# Patient Record
Sex: FEMALE | Race: WHITE | NOT HISPANIC OR LATINO | Employment: FULL TIME | URBAN - NONMETROPOLITAN AREA
[De-identification: names, ages, dates, MRNs, and addresses within clinical notes are randomized per-mention and may not be internally consistent; named-entity substitution may affect disease eponyms.]

---

## 2018-10-23 ENCOUNTER — APPOINTMENT (OUTPATIENT)
Dept: GENERAL RADIOLOGY | Facility: HOSPITAL | Age: 28
End: 2018-10-23

## 2018-10-23 ENCOUNTER — HOSPITAL ENCOUNTER (EMERGENCY)
Facility: HOSPITAL | Age: 28
Discharge: HOME | End: 2018-10-23
Attending: EMERGENCY MEDICINE | Admitting: EMERGENCY MEDICINE

## 2018-10-23 ENCOUNTER — APPOINTMENT (OUTPATIENT)
Dept: CT IMAGING | Facility: HOSPITAL | Age: 28
End: 2018-10-23

## 2018-10-23 VITALS
HEIGHT: 66 IN | DIASTOLIC BLOOD PRESSURE: 89 MMHG | TEMPERATURE: 98.9 F | BODY MASS INDEX: 27.97 KG/M2 | SYSTOLIC BLOOD PRESSURE: 140 MMHG | RESPIRATION RATE: 18 BRPM | HEART RATE: 97 BPM | OXYGEN SATURATION: 100 % | WEIGHT: 174 LBS

## 2018-10-23 DIAGNOSIS — S46.911A STRAIN OF RIGHT SHOULDER, INITIAL ENCOUNTER: ICD-10-CM
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
## 2018-10-23 DIAGNOSIS — S16.1XXA CERVICAL STRAIN, ACUTE, INITIAL ENCOUNTER: ICD-10-CM

## 2018-10-23 PROCEDURE — 73030 X-RAY EXAM OF SHOULDER: CPT | Performed by: RADIOLOGY

## 2018-10-23 PROCEDURE — 72125 CT NECK SPINE W/O DYE: CPT

## 2018-10-23 PROCEDURE — 72125 CT NECK SPINE W/O DYE: CPT | Performed by: RADIOLOGY

## 2018-10-23 PROCEDURE — 73030 X-RAY EXAM OF SHOULDER: CPT

## 2018-10-23 PROCEDURE — 72128 CT CHEST SPINE W/O DYE: CPT | Performed by: RADIOLOGY

## 2018-10-23 PROCEDURE — 99283 EMERGENCY DEPT VISIT LOW MDM: CPT

## 2018-10-23 PROCEDURE — 72128 CT CHEST SPINE W/O DYE: CPT

## 2018-10-23 RX ORDER — ORPHENADRINE CITRATE 100 MG/1
100 TABLET, EXTENDED RELEASE ORAL 2 TIMES DAILY
Qty: 20 TABLET | Refills: 0 | Status: ON HOLD | OUTPATIENT
Start: 2018-10-23 | End: 2020-02-24

## 2018-10-23 RX ORDER — IBUPROFEN 800 MG/1
800 TABLET ORAL
Qty: 60 TABLET | Refills: 0 | Status: ON HOLD | OUTPATIENT
Start: 2018-10-23 | End: 2020-02-24
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
## 2019-08-27 LAB
EXTERNAL ABO GROUPING: NORMAL
EXTERNAL ANTIBODY SCREEN: NEGATIVE
EXTERNAL HEPATITIS B SURFACE ANTIGEN: NEGATIVE
EXTERNAL RH FACTOR: POSITIVE
EXTERNAL RUBELLA QUALITATIVE: NORMAL
EXTERNAL SYPHILIS RPR SCREEN: NORMAL
HIV1 P24 AG SERPL QL IA: NORMAL

## 2020-02-20 LAB
EXTERNAL CHLAMYDIA SCREEN: NEGATIVE
EXTERNAL GONORRHEA SCREEN: NEGATIVE
EXTERNAL GROUP B STREP ANTIGEN: NEGATIVE

## 2020-02-24 ENCOUNTER — HOSPITAL ENCOUNTER (INPATIENT)
Facility: HOSPITAL | Age: 30
LOS: 3 days | Discharge: HOME | End: 2020-02-27
Attending: OBSTETRICS & GYNECOLOGY | Admitting: OBSTETRICS & GYNECOLOGY

## 2020-02-24 PROBLEM — O28.8 NON-REACTIVE NST (NON-STRESS TEST): Status: ACTIVE | Noted: 2020-02-24

## 2020-02-24 PROBLEM — O14.10 SEVERE PREECLAMPSIA: Status: ACTIVE | Noted: 2020-02-24

## 2020-02-24 LAB
ABO GROUP BLD: NORMAL
ABO GROUP BLD: NORMAL
ALBUMIN SERPL-MCNC: 3.05 G/DL (ref 3.5–5.2)
ALBUMIN/GLOB SERPL: 1 G/DL
ALP SERPL-CCNC: 155 U/L (ref 39–117)
ALT SERPL W P-5'-P-CCNC: 29 U/L (ref 1–33)
ANION GAP SERPL CALCULATED.3IONS-SCNC: 11.5 MMOL/L (ref 5–15)
AST SERPL-CCNC: 30 U/L (ref 1–32)
BASOPHILS # BLD AUTO: 0.02 10*3/MM3 (ref 0–0.2)
BASOPHILS NFR BLD AUTO: 0.3 % (ref 0–1.5)
BILIRUB SERPL-MCNC: 0.2 MG/DL (ref 0.2–1.2)
BLD GP AB SCN SERPL QL: NEGATIVE
BUN BLD-MCNC: 10 MG/DL (ref 6–20)
BUN/CREAT SERPL: 17.5 (ref 7–25)
CALCIUM SPEC-SCNC: 8.7 MG/DL (ref 8.6–10.5)
CHLORIDE SERPL-SCNC: 108 MMOL/L (ref 98–107)
CO2 SERPL-SCNC: 17.5 MMOL/L (ref 22–29)
CREAT BLD-MCNC: 0.57 MG/DL (ref 0.57–1)
DEPRECATED RDW RBC AUTO: 45.9 FL (ref 37–54)
EOSINOPHIL # BLD AUTO: 0.12 10*3/MM3 (ref 0–0.4)
EOSINOPHIL NFR BLD AUTO: 1.7 % (ref 0.3–6.2)
ERYTHROCYTE [DISTWIDTH] IN BLOOD BY AUTOMATED COUNT: 14.3 % (ref 12.3–15.4)
GFR SERPL CREATININE-BSD FRML MDRD: 127 ML/MIN/1.73
GLOBULIN UR ELPH-MCNC: 3.1 GM/DL
GLUCOSE BLD-MCNC: 113 MG/DL (ref 65–99)
HCT VFR BLD AUTO: 35.3 % (ref 34–46.6)
HGB BLD-MCNC: 11.6 G/DL (ref 12–15.9)
IMM GRANULOCYTES # BLD AUTO: 0.16 10*3/MM3 (ref 0–0.05)
IMM GRANULOCYTES NFR BLD AUTO: 2.3 % (ref 0–0.5)
LDH SERPL-CCNC: 156 U/L (ref 135–214)
LYMPHOCYTES # BLD AUTO: 1.23 10*3/MM3 (ref 0.7–3.1)
LYMPHOCYTES NFR BLD AUTO: 17.8 % (ref 19.6–45.3)
MCH RBC QN AUTO: 29.2 PG (ref 26.6–33)
MCHC RBC AUTO-ENTMCNC: 32.9 G/DL (ref 31.5–35.7)
MCV RBC AUTO: 88.9 FL (ref 79–97)
MONOCYTES # BLD AUTO: 0.39 10*3/MM3 (ref 0.1–0.9)
MONOCYTES NFR BLD AUTO: 5.6 % (ref 5–12)
NEUTROPHILS # BLD AUTO: 4.99 10*3/MM3 (ref 1.7–7)
NEUTROPHILS NFR BLD AUTO: 72.3 % (ref 42.7–76)
NRBC BLD AUTO-RTO: 0 /100 WBC (ref 0–0.2)
PLATELET # BLD AUTO: 177 10*3/MM3 (ref 140–450)
PMV BLD AUTO: 10.8 FL (ref 6–12)
POTASSIUM BLD-SCNC: 3.9 MMOL/L (ref 3.5–5.2)
PROT SERPL-MCNC: 6.1 G/DL (ref 6–8.5)
RBC # BLD AUTO: 3.97 10*6/MM3 (ref 3.77–5.28)
RH BLD: POSITIVE
RH BLD: POSITIVE
SODIUM BLD-SCNC: 137 MMOL/L (ref 136–145)
T&S EXPIRATION DATE: NORMAL
URATE SERPL-MCNC: 5.7 MG/DL (ref 2.4–5.7)
WBC NRBC COR # BLD: 6.91 10*3/MM3 (ref 3.4–10.8)

## 2020-02-24 PROCEDURE — 86900 BLOOD TYPING SEROLOGIC ABO: CPT | Performed by: OBSTETRICS & GYNECOLOGY

## 2020-02-24 PROCEDURE — 80053 COMPREHEN METABOLIC PANEL: CPT | Performed by: OBSTETRICS & GYNECOLOGY

## 2020-02-24 PROCEDURE — 83615 LACTATE (LD) (LDH) ENZYME: CPT | Performed by: OBSTETRICS & GYNECOLOGY

## 2020-02-24 PROCEDURE — 85025 COMPLETE CBC W/AUTO DIFF WBC: CPT | Performed by: OBSTETRICS & GYNECOLOGY

## 2020-02-24 PROCEDURE — 84550 ASSAY OF BLOOD/URIC ACID: CPT | Performed by: OBSTETRICS & GYNECOLOGY

## 2020-02-24 PROCEDURE — 59025 FETAL NON-STRESS TEST: CPT

## 2020-02-24 PROCEDURE — 86900 BLOOD TYPING SEROLOGIC ABO: CPT

## 2020-02-24 PROCEDURE — 86850 RBC ANTIBODY SCREEN: CPT | Performed by: OBSTETRICS & GYNECOLOGY

## 2020-02-24 PROCEDURE — 86901 BLOOD TYPING SEROLOGIC RH(D): CPT

## 2020-02-24 PROCEDURE — 86901 BLOOD TYPING SEROLOGIC RH(D): CPT | Performed by: OBSTETRICS & GYNECOLOGY

## 2020-02-24 PROCEDURE — G0463 HOSPITAL OUTPT CLINIC VISIT: HCPCS

## 2020-02-24 PROCEDURE — 36415 COLL VENOUS BLD VENIPUNCTURE: CPT | Performed by: OBSTETRICS & GYNECOLOGY

## 2020-02-24 RX ORDER — ZOLPIDEM TARTRATE 5 MG/1
5 TABLET ORAL NIGHTLY PRN
Status: DISCONTINUED | OUTPATIENT
Start: 2020-02-24 | End: 2020-02-25

## 2020-02-24 RX ORDER — DIPHENHYDRAMINE HCL 25 MG
25 TABLET ORAL EVERY 6 HOURS PRN
COMMUNITY
End: 2020-03-02

## 2020-02-24 RX ORDER — ONDANSETRON 4 MG/1
4 TABLET, FILM COATED ORAL EVERY 6 HOURS PRN
Status: DISCONTINUED | OUTPATIENT
Start: 2020-02-24 | End: 2020-02-25

## 2020-02-24 RX ORDER — BUTORPHANOL TARTRATE 1 MG/ML
1 INJECTION, SOLUTION INTRAMUSCULAR; INTRAVENOUS
Status: DISCONTINUED | OUTPATIENT
Start: 2020-02-24 | End: 2020-02-25

## 2020-02-24 RX ORDER — ACETAMINOPHEN 500 MG
500 TABLET ORAL EVERY 6 HOURS PRN
Status: CANCELLED | OUTPATIENT
Start: 2020-02-24

## 2020-02-24 RX ORDER — OXYTOCIN-SODIUM CHLORIDE 0.9% IV SOLN 30 UNIT/500ML 30-0.9/5 UT/ML-%
2-20 SOLUTION INTRAVENOUS
Status: DISCONTINUED | OUTPATIENT
Start: 2020-02-24 | End: 2020-02-25

## 2020-02-24 RX ORDER — PRENATAL VIT NO.126/IRON/FOLIC 28MG-0.8MG
1 TABLET ORAL DAILY
COMMUNITY
End: 2020-03-02

## 2020-02-24 RX ORDER — ACETAMINOPHEN 500 MG
500 TABLET ORAL EVERY 6 HOURS PRN
COMMUNITY
End: 2020-03-02

## 2020-02-24 RX ORDER — LABETALOL 200 MG/1
200 TABLET, FILM COATED ORAL EVERY 8 HOURS SCHEDULED
Status: DISCONTINUED | OUTPATIENT
Start: 2020-02-24 | End: 2020-02-25

## 2020-02-24 RX ORDER — SODIUM CHLORIDE 0.9 % (FLUSH) 0.9 %
3-10 SYRINGE (ML) INJECTION AS NEEDED
Status: DISCONTINUED | OUTPATIENT
Start: 2020-02-24 | End: 2020-02-25

## 2020-02-24 RX ORDER — MISOPROSTOL 100 UG/1
25 TABLET ORAL EVERY 4 HOURS PRN
Status: DISCONTINUED | OUTPATIENT
Start: 2020-02-24 | End: 2020-02-25

## 2020-02-24 RX ORDER — SODIUM CHLORIDE, SODIUM LACTATE, POTASSIUM CHLORIDE, CALCIUM CHLORIDE 600; 310; 30; 20 MG/100ML; MG/100ML; MG/100ML; MG/100ML
125 INJECTION, SOLUTION INTRAVENOUS CONTINUOUS
Status: DISCONTINUED | OUTPATIENT
Start: 2020-02-24 | End: 2020-02-25

## 2020-02-24 RX ORDER — LABETALOL 200 MG/1
200 TABLET, FILM COATED ORAL EVERY 8 HOURS SCHEDULED
Status: DISCONTINUED | OUTPATIENT
Start: 2020-02-24 | End: 2020-02-24

## 2020-02-24 RX ORDER — MAGNESIUM HYDROXIDE 1200 MG/15ML
1000 LIQUID ORAL ONCE AS NEEDED
Status: DISCONTINUED | OUTPATIENT
Start: 2020-02-24 | End: 2020-02-25

## 2020-02-24 RX ORDER — DIPHENHYDRAMINE HCL 25 MG
25 CAPSULE ORAL EVERY 6 HOURS PRN
Status: CANCELLED | OUTPATIENT
Start: 2020-02-24

## 2020-02-24 RX ORDER — ONDANSETRON 2 MG/ML
4 INJECTION INTRAMUSCULAR; INTRAVENOUS EVERY 6 HOURS PRN
Status: DISCONTINUED | OUTPATIENT
Start: 2020-02-24 | End: 2020-02-25

## 2020-02-24 RX ORDER — TERBUTALINE SULFATE 1 MG/ML
0.2 INJECTION, SOLUTION SUBCUTANEOUS AS NEEDED
Status: DISCONTINUED | OUTPATIENT
Start: 2020-02-24 | End: 2020-02-25

## 2020-02-24 RX ORDER — PRENATAL VIT/IRON FUM/FOLIC AC 27MG-0.8MG
1 TABLET ORAL NIGHTLY
Status: DISCONTINUED | OUTPATIENT
Start: 2020-02-24 | End: 2020-02-25

## 2020-02-24 RX ORDER — ACETAMINOPHEN 325 MG/1
650 TABLET ORAL EVERY 4 HOURS PRN
Status: DISCONTINUED | OUTPATIENT
Start: 2020-02-24 | End: 2020-02-25

## 2020-02-24 RX ADMIN — LABETALOL HCL 200 MG: 200 TABLET, FILM COATED ORAL at 19:02

## 2020-02-24 RX ADMIN — MISOPROSTOL 25 MCG: 100 TABLET ORAL at 18:09

## 2020-02-24 RX ADMIN — SODIUM CHLORIDE, POTASSIUM CHLORIDE, SODIUM LACTATE AND CALCIUM CHLORIDE 125 ML/HR: 600; 310; 30; 20 INJECTION, SOLUTION INTRAVENOUS at 20:24

## 2020-02-24 RX ADMIN — ZOLPIDEM TARTRATE 5 MG: 5 TABLET ORAL at 23:41

## 2020-02-24 RX ADMIN — SODIUM CHLORIDE, POTASSIUM CHLORIDE, SODIUM LACTATE AND CALCIUM CHLORIDE 125 ML/HR: 600; 310; 30; 20 INJECTION, SOLUTION INTRAVENOUS at 13:45

## 2020-02-24 RX ADMIN — MISOPROSTOL 25 MCG: 100 TABLET ORAL at 14:12

## 2020-02-24 RX ADMIN — MISOPROSTOL 25 MCG: 100 TABLET ORAL at 23:22

## 2020-02-25 ENCOUNTER — ANESTHESIA (OUTPATIENT)
Dept: LABOR AND DELIVERY | Facility: HOSPITAL | Age: 30
End: 2020-02-25

## 2020-02-25 ENCOUNTER — ANESTHESIA EVENT (OUTPATIENT)
Dept: LABOR AND DELIVERY | Facility: HOSPITAL | Age: 30
End: 2020-02-25

## 2020-02-25 PROCEDURE — C1755 CATHETER, INTRASPINAL: HCPCS

## 2020-02-25 PROCEDURE — 59025 FETAL NON-STRESS TEST: CPT

## 2020-02-25 PROCEDURE — 0KQM0ZZ REPAIR PERINEUM MUSCLE, OPEN APPROACH: ICD-10-PCS | Performed by: OBSTETRICS & GYNECOLOGY

## 2020-02-25 PROCEDURE — 0UQMXZZ REPAIR VULVA, EXTERNAL APPROACH: ICD-10-PCS | Performed by: OBSTETRICS & GYNECOLOGY

## 2020-02-25 PROCEDURE — 25010000002 ONDANSETRON PER 1 MG: Performed by: OBSTETRICS & GYNECOLOGY

## 2020-02-25 PROCEDURE — 25010000002 BUTORPHANOL PER 1 MG: Performed by: OBSTETRICS & GYNECOLOGY

## 2020-02-25 PROCEDURE — C1755 CATHETER, INTRASPINAL: HCPCS | Performed by: ANESTHESIOLOGY

## 2020-02-25 RX ORDER — BUPIVACAINE HYDROCHLORIDE 2.5 MG/ML
INJECTION, SOLUTION EPIDURAL; INFILTRATION; INTRACAUDAL
Status: COMPLETED
Start: 2020-02-25 | End: 2020-02-25

## 2020-02-25 RX ORDER — ONDANSETRON 2 MG/ML
4 INJECTION INTRAMUSCULAR; INTRAVENOUS EVERY 6 HOURS PRN
Status: DISCONTINUED | OUTPATIENT
Start: 2020-02-25 | End: 2020-02-27 | Stop reason: HOSPADM

## 2020-02-25 RX ORDER — IBUPROFEN 800 MG/1
800 TABLET ORAL EVERY 8 HOURS SCHEDULED
Status: DISCONTINUED | OUTPATIENT
Start: 2020-02-25 | End: 2020-02-26 | Stop reason: HOSPADM

## 2020-02-25 RX ORDER — ONDANSETRON 4 MG/1
4 TABLET, FILM COATED ORAL EVERY 6 HOURS PRN
Status: DISCONTINUED | OUTPATIENT
Start: 2020-02-25 | End: 2020-02-27 | Stop reason: HOSPADM

## 2020-02-25 RX ORDER — ACETAMINOPHEN 325 MG/1
650 TABLET ORAL EVERY 4 HOURS PRN
Status: DISCONTINUED | OUTPATIENT
Start: 2020-02-25 | End: 2020-02-26 | Stop reason: HOSPADM

## 2020-02-25 RX ORDER — BISACODYL 10 MG
10 SUPPOSITORY, RECTAL RECTAL DAILY PRN
Status: DISCONTINUED | OUTPATIENT
Start: 2020-02-26 | End: 2020-02-27 | Stop reason: HOSPADM

## 2020-02-25 RX ORDER — CARBOPROST TROMETHAMINE 250 UG/ML
250 INJECTION, SOLUTION INTRAMUSCULAR ONCE AS NEEDED
Status: DISCONTINUED | OUTPATIENT
Start: 2020-02-25 | End: 2020-02-27 | Stop reason: HOSPADM

## 2020-02-25 RX ORDER — EPHEDRINE SULFATE 50 MG/ML
10 INJECTION, SOLUTION INTRAVENOUS
Status: DISCONTINUED | OUTPATIENT
Start: 2020-02-25 | End: 2020-02-25

## 2020-02-25 RX ORDER — METOCLOPRAMIDE 10 MG/1
10 TABLET ORAL ONCE
Status: DISCONTINUED | OUTPATIENT
Start: 2020-02-25 | End: 2020-02-27 | Stop reason: HOSPADM

## 2020-02-25 RX ORDER — HYDROCODONE BITARTRATE AND ACETAMINOPHEN 5; 325 MG/1; MG/1
1 TABLET ORAL EVERY 4 HOURS PRN
Status: DISCONTINUED | OUTPATIENT
Start: 2020-02-25 | End: 2020-02-25

## 2020-02-25 RX ORDER — METHYLERGONOVINE MALEATE 0.2 MG/ML
200 INJECTION INTRAVENOUS ONCE AS NEEDED
Status: DISCONTINUED | OUTPATIENT
Start: 2020-02-25 | End: 2020-02-27 | Stop reason: HOSPADM

## 2020-02-25 RX ORDER — MISOPROSTOL 100 UG/1
600 TABLET ORAL AS NEEDED
Status: DISCONTINUED | OUTPATIENT
Start: 2020-02-25 | End: 2020-02-25

## 2020-02-25 RX ORDER — OXYTOCIN-SODIUM CHLORIDE 0.9% IV SOLN 30 UNIT/500ML 30-0.9/5 UT/ML-%
250 SOLUTION INTRAVENOUS CONTINUOUS
Status: ACTIVE | OUTPATIENT
Start: 2020-02-25 | End: 2020-02-25

## 2020-02-25 RX ORDER — LABETALOL 200 MG/1
200 TABLET, FILM COATED ORAL EVERY 8 HOURS SCHEDULED
Status: DISCONTINUED | OUTPATIENT
Start: 2020-02-26 | End: 2020-02-27 | Stop reason: HOSPADM

## 2020-02-25 RX ORDER — HYDROCODONE BITARTRATE AND ACETAMINOPHEN 5; 325 MG/1; MG/1
1 TABLET ORAL EVERY 4 HOURS PRN
Status: DISCONTINUED | OUTPATIENT
Start: 2020-02-25 | End: 2020-02-27 | Stop reason: HOSPADM

## 2020-02-25 RX ORDER — DOCUSATE SODIUM 100 MG/1
100 CAPSULE, LIQUID FILLED ORAL DAILY
Status: DISCONTINUED | OUTPATIENT
Start: 2020-02-25 | End: 2020-02-27 | Stop reason: HOSPADM

## 2020-02-25 RX ORDER — LANOLIN 100 %
OINTMENT (GRAM) TOPICAL
Status: DISCONTINUED | OUTPATIENT
Start: 2020-02-25 | End: 2020-02-27 | Stop reason: HOSPADM

## 2020-02-25 RX ORDER — ROPIVACAINE HYDROCHLORIDE 2 MG/ML
14 INJECTION, SOLUTION EPIDURAL; INFILTRATION; PERINEURAL CONTINUOUS
Status: DISCONTINUED | OUTPATIENT
Start: 2020-02-25 | End: 2020-02-25

## 2020-02-25 RX ORDER — OXYTOCIN-SODIUM CHLORIDE 0.9% IV SOLN 30 UNIT/500ML 30-0.9/5 UT/ML-%
999 SOLUTION INTRAVENOUS ONCE
Status: COMPLETED | OUTPATIENT
Start: 2020-02-25 | End: 2020-02-25

## 2020-02-25 RX ORDER — HYDROCODONE BITARTRATE AND ACETAMINOPHEN 5; 325 MG/1; MG/1
1 TABLET ORAL EVERY 4 HOURS PRN
Status: DISCONTINUED | OUTPATIENT
Start: 2020-02-25 | End: 2020-02-26 | Stop reason: HOSPADM

## 2020-02-25 RX ORDER — ONDANSETRON 2 MG/ML
4 INJECTION INTRAMUSCULAR; INTRAVENOUS ONCE AS NEEDED
Status: DISCONTINUED | OUTPATIENT
Start: 2020-02-25 | End: 2020-02-25

## 2020-02-25 RX ORDER — SODIUM CHLORIDE 0.9 % (FLUSH) 0.9 %
1-10 SYRINGE (ML) INJECTION AS NEEDED
Status: DISCONTINUED | OUTPATIENT
Start: 2020-02-25 | End: 2020-02-27 | Stop reason: HOSPADM

## 2020-02-25 RX ORDER — IBUPROFEN 800 MG/1
800 TABLET ORAL EVERY 8 HOURS SCHEDULED
Status: DISCONTINUED | OUTPATIENT
Start: 2020-02-25 | End: 2020-02-25

## 2020-02-25 RX ORDER — OXYTOCIN-SODIUM CHLORIDE 0.9% IV SOLN 30 UNIT/500ML 30-0.9/5 UT/ML-%
125 SOLUTION INTRAVENOUS CONTINUOUS PRN
Status: DISCONTINUED | OUTPATIENT
Start: 2020-02-25 | End: 2020-02-26 | Stop reason: HOSPADM

## 2020-02-25 RX ORDER — FAMOTIDINE 10 MG/ML
20 INJECTION, SOLUTION INTRAVENOUS ONCE AS NEEDED
Status: DISCONTINUED | OUTPATIENT
Start: 2020-02-25 | End: 2020-02-25

## 2020-02-25 RX ORDER — PRENATAL VIT/IRON FUM/FOLIC AC 27MG-0.8MG
1 TABLET ORAL DAILY
Status: DISCONTINUED | OUTPATIENT
Start: 2020-02-26 | End: 2020-02-27 | Stop reason: HOSPADM

## 2020-02-25 RX ORDER — BUPIVACAINE HYDROCHLORIDE 2.5 MG/ML
INJECTION, SOLUTION EPIDURAL; INFILTRATION; INTRACAUDAL AS NEEDED
Status: DISCONTINUED | OUTPATIENT
Start: 2020-02-25 | End: 2020-02-25 | Stop reason: SURG

## 2020-02-25 RX ORDER — METHYLERGONOVINE MALEATE 0.2 MG/ML
200 INJECTION INTRAVENOUS ONCE AS NEEDED
Status: DISCONTINUED | OUTPATIENT
Start: 2020-02-25 | End: 2020-02-25

## 2020-02-25 RX ORDER — DIPHENHYDRAMINE HCL 25 MG
25 CAPSULE ORAL NIGHTLY PRN
Status: DISCONTINUED | OUTPATIENT
Start: 2020-02-25 | End: 2020-02-27 | Stop reason: HOSPADM

## 2020-02-25 RX ORDER — CARBOPROST TROMETHAMINE 250 UG/ML
250 INJECTION, SOLUTION INTRAMUSCULAR AS NEEDED
Status: DISCONTINUED | OUTPATIENT
Start: 2020-02-25 | End: 2020-02-25

## 2020-02-25 RX ORDER — MISOPROSTOL 100 UG/1
600 TABLET ORAL ONCE AS NEEDED
Status: DISCONTINUED | OUTPATIENT
Start: 2020-02-25 | End: 2020-02-27 | Stop reason: HOSPADM

## 2020-02-25 RX ADMIN — ACETAMINOPHEN 325 MG: 325 TABLET ORAL at 18:26

## 2020-02-25 RX ADMIN — LABETALOL HCL 200 MG: 200 TABLET, FILM COATED ORAL at 16:39

## 2020-02-25 RX ADMIN — HYDROCODONE BITARTRATE AND ACETAMINOPHEN 1 TABLET: 5; 325 TABLET ORAL at 17:37

## 2020-02-25 RX ADMIN — Medication: at 17:37

## 2020-02-25 RX ADMIN — ACETAMINOPHEN 650 MG: 325 TABLET ORAL at 03:37

## 2020-02-25 RX ADMIN — HYDROCODONE BITARTRATE AND ACETAMINOPHEN 1 TABLET: 5; 325 TABLET ORAL at 22:37

## 2020-02-25 RX ADMIN — OXYTOCIN 250 ML/HR: 10 INJECTION INTRAVENOUS at 15:10

## 2020-02-25 RX ADMIN — SODIUM CHLORIDE, POTASSIUM CHLORIDE, SODIUM LACTATE AND CALCIUM CHLORIDE 1000 ML: 600; 310; 30; 20 INJECTION, SOLUTION INTRAVENOUS at 10:44

## 2020-02-25 RX ADMIN — MISOPROSTOL 25 MCG: 100 TABLET ORAL at 03:52

## 2020-02-25 RX ADMIN — LABETALOL HCL 200 MG: 200 TABLET, FILM COATED ORAL at 06:45

## 2020-02-25 RX ADMIN — BUTORPHANOL TARTRATE 1 MG: 1 INJECTION, SOLUTION INTRAMUSCULAR; INTRAVENOUS at 09:50

## 2020-02-25 RX ADMIN — OXYTOCIN 999 ML/HR: 10 INJECTION INTRAVENOUS at 14:54

## 2020-02-25 RX ADMIN — SODIUM CHLORIDE, POTASSIUM CHLORIDE, SODIUM LACTATE AND CALCIUM CHLORIDE 125 ML/HR: 600; 310; 30; 20 INJECTION, SOLUTION INTRAVENOUS at 09:50

## 2020-02-25 RX ADMIN — BUPIVACAINE HYDROCHLORIDE 10 ML: 2.5 INJECTION, SOLUTION EPIDURAL; INFILTRATION; INTRACAUDAL; PERINEURAL at 11:36

## 2020-02-25 RX ADMIN — DOCUSATE SODIUM 100 MG: 100 CAPSULE ORAL at 21:53

## 2020-02-25 RX ADMIN — IBUPROFEN 800 MG: 800 TABLET, FILM COATED ORAL at 21:52

## 2020-02-25 RX ADMIN — MISOPROSTOL 600 MCG: 100 TABLET ORAL at 15:10

## 2020-02-25 RX ADMIN — LABETALOL HCL 200 MG: 200 TABLET, FILM COATED ORAL at 23:41

## 2020-02-25 RX ADMIN — ONDANSETRON 4 MG: 2 INJECTION INTRAMUSCULAR; INTRAVENOUS at 18:48

## 2020-02-25 RX ADMIN — OXYTOCIN 2 MILLI-UNITS/MIN: 10 INJECTION INTRAVENOUS at 08:18

## 2020-02-25 NOTE — ANESTHESIA PREPROCEDURE EVALUATION
Anesthesia Evaluation     no history of anesthetic complications:  NPO Solid Status: > 8 hours  NPO Liquid Status: > 8 hours           Airway   Mallampati: II  TM distance: >3 FB  Neck ROM: full  Dental - normal exam     Pulmonary - normal exam   Cardiovascular - normal exam    (+) hypertension,       Neuro/Psych  GI/Hepatic/Renal/Endo      Musculoskeletal     Abdominal  - normal exam   Substance History      OB/GYN    (+) Preeclampsia,         Other                        Anesthesia Plan    ASA 2     epidural       Anesthetic plan, all risks, benefits, and alternatives have been provided, discussed and informed consent has been obtained with: patient.

## 2020-02-25 NOTE — ANESTHESIA PROCEDURE NOTES
Labor Epidural    Pre-sedation assessment completed: 2/25/2020 11:25 AM    Patient location during procedure: OB  Start Time: 2/25/2020 11:25 AM  Stop Time: 2/25/2020 11:35 AM  Indication:at surgeon's request  Performed By  Anesthesiologist: Grayson Arguelles MD  Preanesthetic Checklist  Completed: patient identified, site marked, surgical consent, pre-op evaluation, timeout performed, IV checked, risks and benefits discussed and monitors and equipment checked  Prep:  Pt Position:sitting  Sterile Tech:gloves, mask, sterile barrier and cap  Prep:povidone-iodine 7.5% surgical scrub  Monitoring:blood pressure monitoring  Epidural Block Procedure:  Approach:midline  Guidance:landmark technique and palpation technique  Location:L3-L4  Needle Type:Tuohy  Needle Gauge:17 G  Loss of Resistance Medium: air  Loss of Resistance: 7cm  Cath Depth at skin:9 cm  Paresthesia: none  Aspiration:negative  Test Dose:negative  Number of Attempts: 1  Post Assessment:  Dressing:occlusive dressing applied and secured with tape  Pt Tolerance:patient tolerated the procedure well with no apparent complications  Complications:no

## 2020-02-26 LAB
BASOPHILS # BLD AUTO: 0.02 10*3/MM3 (ref 0–0.2)
BASOPHILS NFR BLD AUTO: 0.2 % (ref 0–1.5)
DEPRECATED RDW RBC AUTO: 48 FL (ref 37–54)
EOSINOPHIL # BLD AUTO: 0.08 10*3/MM3 (ref 0–0.4)
EOSINOPHIL NFR BLD AUTO: 0.9 % (ref 0.3–6.2)
ERYTHROCYTE [DISTWIDTH] IN BLOOD BY AUTOMATED COUNT: 14.4 % (ref 12.3–15.4)
HBV SURFACE AG SERPL QL IA: NORMAL
HCT VFR BLD AUTO: 26.2 % (ref 34–46.6)
HCV AB SER DONR QL: NORMAL
HGB BLD-MCNC: 8.5 G/DL (ref 12–15.9)
HIV1+2 AB SER QL: NORMAL
HOLD SPECIMEN: NORMAL
IMM GRANULOCYTES # BLD AUTO: 0.17 10*3/MM3 (ref 0–0.05)
IMM GRANULOCYTES NFR BLD AUTO: 1.9 % (ref 0–0.5)
LYMPHOCYTES # BLD AUTO: 1.82 10*3/MM3 (ref 0.7–3.1)
LYMPHOCYTES NFR BLD AUTO: 20.7 % (ref 19.6–45.3)
MCH RBC QN AUTO: 29.7 PG (ref 26.6–33)
MCHC RBC AUTO-ENTMCNC: 32.4 G/DL (ref 31.5–35.7)
MCV RBC AUTO: 91.6 FL (ref 79–97)
MONOCYTES # BLD AUTO: 0.55 10*3/MM3 (ref 0.1–0.9)
MONOCYTES NFR BLD AUTO: 6.3 % (ref 5–12)
NEUTROPHILS # BLD AUTO: 6.15 10*3/MM3 (ref 1.7–7)
NEUTROPHILS NFR BLD AUTO: 70 % (ref 42.7–76)
NRBC BLD AUTO-RTO: 0 /100 WBC (ref 0–0.2)
PLATELET # BLD AUTO: 173 10*3/MM3 (ref 140–450)
PMV BLD AUTO: 10.6 FL (ref 6–12)
RBC # BLD AUTO: 2.86 10*6/MM3 (ref 3.77–5.28)
WBC NRBC COR # BLD: 8.79 10*3/MM3 (ref 3.4–10.8)

## 2020-02-26 PROCEDURE — 86803 HEPATITIS C AB TEST: CPT | Performed by: OBSTETRICS & GYNECOLOGY

## 2020-02-26 PROCEDURE — 87340 HEPATITIS B SURFACE AG IA: CPT | Performed by: OBSTETRICS & GYNECOLOGY

## 2020-02-26 PROCEDURE — G0432 EIA HIV-1/HIV-2 SCREEN: HCPCS | Performed by: OBSTETRICS & GYNECOLOGY

## 2020-02-26 PROCEDURE — 85025 COMPLETE CBC W/AUTO DIFF WBC: CPT | Performed by: OBSTETRICS & GYNECOLOGY

## 2020-02-26 RX ORDER — FERROUS SULFATE 325(65) MG
325 TABLET ORAL 2 TIMES DAILY WITH MEALS
Status: DISCONTINUED | OUTPATIENT
Start: 2020-02-26 | End: 2020-02-27 | Stop reason: HOSPADM

## 2020-02-26 RX ADMIN — FERROUS SULFATE TAB 325 MG (65 MG ELEMENTAL FE) 325 MG: 325 (65 FE) TAB at 18:05

## 2020-02-26 RX ADMIN — PRENATAL VIT W/ FE FUMARATE-FA TAB 27-0.8 MG 1 TABLET: 27-0.8 TAB at 09:07

## 2020-02-26 RX ADMIN — LABETALOL HCL 200 MG: 200 TABLET, FILM COATED ORAL at 13:46

## 2020-02-26 RX ADMIN — LABETALOL HCL 200 MG: 200 TABLET, FILM COATED ORAL at 21:45

## 2020-02-26 RX ADMIN — LABETALOL HCL 200 MG: 200 TABLET, FILM COATED ORAL at 05:40

## 2020-02-26 RX ADMIN — HYDROCODONE BITARTRATE AND ACETAMINOPHEN 1 TABLET: 5; 325 TABLET ORAL at 20:21

## 2020-02-26 RX ADMIN — HYDROCODONE BITARTRATE AND ACETAMINOPHEN 1 TABLET: 5; 325 TABLET ORAL at 10:46

## 2020-02-26 RX ADMIN — IBUPROFEN 800 MG: 800 TABLET, FILM COATED ORAL at 05:40

## 2020-02-26 RX ADMIN — Medication: at 20:21

## 2020-02-26 RX ADMIN — DOCUSATE SODIUM 100 MG: 100 CAPSULE ORAL at 09:08

## 2020-02-26 RX ADMIN — IBUPROFEN 800 MG: 800 TABLET, FILM COATED ORAL at 13:45

## 2020-02-27 VITALS
HEIGHT: 66 IN | TEMPERATURE: 98.1 F | RESPIRATION RATE: 18 BRPM | BODY MASS INDEX: 35.68 KG/M2 | HEART RATE: 78 BPM | OXYGEN SATURATION: 95 % | SYSTOLIC BLOOD PRESSURE: 124 MMHG | WEIGHT: 222 LBS | DIASTOLIC BLOOD PRESSURE: 77 MMHG

## 2020-02-27 PROBLEM — O28.8 NON-REACTIVE NST (NON-STRESS TEST): Status: RESOLVED | Noted: 2020-02-24 | Resolved: 2020-02-27

## 2020-02-27 PROBLEM — O14.10 SEVERE PREECLAMPSIA: Status: RESOLVED | Noted: 2020-02-24 | Resolved: 2020-02-27

## 2020-02-27 RX ORDER — FERROUS SULFATE 325(65) MG
325 TABLET ORAL 2 TIMES DAILY WITH MEALS
Qty: 60 TABLET | Refills: 0 | Status: SHIPPED | OUTPATIENT
Start: 2020-02-27 | End: 2020-03-02

## 2020-02-27 RX ORDER — IBUPROFEN 800 MG/1
800 TABLET ORAL EVERY 6 HOURS PRN
Qty: 30 TABLET | Refills: 0 | Status: ON HOLD | OUTPATIENT
Start: 2020-02-27 | End: 2021-11-16

## 2020-02-27 RX ORDER — AZITHROMYCIN 250 MG/1
TABLET, FILM COATED ORAL
Qty: 6 TABLET | Refills: 0 | Status: SHIPPED | OUTPATIENT
Start: 2020-02-27 | End: 2020-03-02

## 2020-02-27 RX ADMIN — HYDROCODONE BITARTRATE AND ACETAMINOPHEN 1 TABLET: 5; 325 TABLET ORAL at 04:51

## 2020-02-27 RX ADMIN — FERROUS SULFATE TAB 325 MG (65 MG ELEMENTAL FE) 325 MG: 325 (65 FE) TAB at 08:56

## 2020-02-27 RX ADMIN — PRENATAL VIT W/ FE FUMARATE-FA TAB 27-0.8 MG 1 TABLET: 27-0.8 TAB at 08:56

## 2020-02-27 RX ADMIN — DOCUSATE SODIUM 100 MG: 100 CAPSULE ORAL at 08:56

## 2020-03-02 ENCOUNTER — HOSPITAL ENCOUNTER (EMERGENCY)
Facility: HOSPITAL | Age: 30
Discharge: STILL ADMITTED OR ADMINISTRATIVE | End: 2020-03-02
Attending: EMERGENCY MEDICINE | Admitting: OBSTETRICS & GYNECOLOGY

## 2020-03-02 ENCOUNTER — APPOINTMENT (OUTPATIENT)
Dept: GENERAL RADIOLOGY | Facility: HOSPITAL | Age: 30
End: 2020-03-02

## 2020-03-02 ENCOUNTER — HOSPITAL ENCOUNTER (OUTPATIENT)
Facility: HOSPITAL | Age: 30
Setting detail: OBSERVATION
Discharge: HOME | End: 2020-03-03
Attending: OBSTETRICS & GYNECOLOGY | Admitting: OBSTETRICS & GYNECOLOGY

## 2020-03-02 ENCOUNTER — APPOINTMENT (OUTPATIENT)
Dept: CARDIOLOGY | Facility: HOSPITAL | Age: 30
End: 2020-03-02

## 2020-03-02 VITALS
DIASTOLIC BLOOD PRESSURE: 93 MMHG | OXYGEN SATURATION: 95 % | BODY MASS INDEX: 41.41 KG/M2 | HEART RATE: 105 BPM | WEIGHT: 225 LBS | SYSTOLIC BLOOD PRESSURE: 136 MMHG | HEIGHT: 62 IN | TEMPERATURE: 98.1 F | RESPIRATION RATE: 18 BRPM

## 2020-03-02 DIAGNOSIS — J81.0 ACUTE PULMONARY EDEMA (HCC): ICD-10-CM

## 2020-03-02 DIAGNOSIS — I10 HYPERTENSION, UNSPECIFIED TYPE: ICD-10-CM

## 2020-03-02 LAB
ALBUMIN SERPL-MCNC: 3.21 G/DL (ref 3.5–5.2)
ALBUMIN/GLOB SERPL: 1.1 G/DL
ALP SERPL-CCNC: 127 U/L (ref 39–117)
ALT SERPL W P-5'-P-CCNC: 87 U/L (ref 1–33)
ANION GAP SERPL CALCULATED.3IONS-SCNC: 12 MMOL/L (ref 5–15)
APTT PPP: 27.8 SECONDS (ref 23.8–36.1)
AST SERPL-CCNC: 46 U/L (ref 1–32)
BACTERIA UR QL AUTO: ABNORMAL /HPF
BH CV ECHO MEAS - ACS: 2.5 CM
BH CV ECHO MEAS - AO MAX PG: 7.6 MMHG
BH CV ECHO MEAS - AO MEAN PG: 3 MMHG
BH CV ECHO MEAS - AO ROOT AREA (BSA CORRECTED): 1.6
BH CV ECHO MEAS - AO ROOT AREA: 8.3 CM^2
BH CV ECHO MEAS - AO ROOT DIAM: 3.3 CM
BH CV ECHO MEAS - AO V2 MAX: 138 CM/SEC
BH CV ECHO MEAS - AO V2 MEAN: 85.2 CM/SEC
BH CV ECHO MEAS - AO V2 VTI: 25.1 CM
BH CV ECHO MEAS - BSA(HAYCOCK): 2.2 M^2
BH CV ECHO MEAS - BSA: 2 M^2
BH CV ECHO MEAS - BZI_BMI: 41.2 KILOGRAMS/M^2
BH CV ECHO MEAS - BZI_METRIC_HEIGHT: 157.5 CM
BH CV ECHO MEAS - BZI_METRIC_WEIGHT: 102.1 KG
BH CV ECHO MEAS - EDV(CUBED): 107.2 ML
BH CV ECHO MEAS - EDV(MOD-SP4): 62.2 ML
BH CV ECHO MEAS - EDV(TEICH): 104.9 ML
BH CV ECHO MEAS - EF(CUBED): 31.9 %
BH CV ECHO MEAS - EF(MOD-SP4): 60 %
BH CV ECHO MEAS - EF(TEICH): 25.9 %
BH CV ECHO MEAS - ESV(CUBED): 73 ML
BH CV ECHO MEAS - ESV(MOD-SP4): 24.9 ML
BH CV ECHO MEAS - ESV(TEICH): 77.7 ML
BH CV ECHO MEAS - FS: 12 %
BH CV ECHO MEAS - IVS/LVPW: 0.89
BH CV ECHO MEAS - IVSD: 1.1 CM
BH CV ECHO MEAS - LA DIMENSION: 2.5 CM
BH CV ECHO MEAS - LA/AO: 0.77
BH CV ECHO MEAS - LV DIASTOLIC VOL/BSA (35-75): 31 ML/M^2
BH CV ECHO MEAS - LV MASS(C)D: 201.7 GRAMS
BH CV ECHO MEAS - LV MASS(C)DI: 100.4 GRAMS/M^2
BH CV ECHO MEAS - LV SYSTOLIC VOL/BSA (12-30): 12.4 ML/M^2
BH CV ECHO MEAS - LVIDD: 4.8 CM
BH CV ECHO MEAS - LVIDS: 4.2 CM
BH CV ECHO MEAS - LVLD AP4: 7.6 CM
BH CV ECHO MEAS - LVLS AP4: 6.9 CM
BH CV ECHO MEAS - LVOT AREA (M): 4.5 CM^2
BH CV ECHO MEAS - LVOT AREA: 4.5 CM^2
BH CV ECHO MEAS - LVOT DIAM: 2.4 CM
BH CV ECHO MEAS - LVPWD: 1.2 CM
BH CV ECHO MEAS - MV A MAX VEL: 40.6 CM/SEC
BH CV ECHO MEAS - MV E MAX VEL: 151 CM/SEC
BH CV ECHO MEAS - MV E/A: 3.7
BH CV ECHO MEAS - PA ACC TIME: 0.11 SEC
BH CV ECHO MEAS - PA PR(ACCEL): 28.2 MMHG
BH CV ECHO MEAS - SI(AO): 103.6 ML/M^2
BH CV ECHO MEAS - SI(CUBED): 17 ML/M^2
BH CV ECHO MEAS - SI(MOD-SP4): 18.6 ML/M^2
BH CV ECHO MEAS - SI(TEICH): 13.5 ML/M^2
BH CV ECHO MEAS - SV(AO): 208.2 ML
BH CV ECHO MEAS - SV(CUBED): 34.1 ML
BH CV ECHO MEAS - SV(MOD-SP4): 37.3 ML
BH CV ECHO MEAS - SV(TEICH): 27.2 ML
BILIRUB SERPL-MCNC: 0.2 MG/DL (ref 0.2–1.2)
BILIRUB UR QL STRIP: NEGATIVE
BUN BLD-MCNC: 10 MG/DL (ref 6–20)
BUN/CREAT SERPL: 16.7 (ref 7–25)
CALCIUM SPEC-SCNC: 8.1 MG/DL (ref 8.6–10.5)
CHLORIDE SERPL-SCNC: 110 MMOL/L (ref 98–107)
CLARITY UR: ABNORMAL
CO2 SERPL-SCNC: 20 MMOL/L (ref 22–29)
COLOR UR: ABNORMAL
CREAT BLD-MCNC: 0.6 MG/DL (ref 0.57–1)
DEPRECATED RDW RBC AUTO: 49.1 FL (ref 37–54)
EOSINOPHIL # BLD MANUAL: 0.2 10*3/MM3 (ref 0–0.4)
EOSINOPHIL NFR BLD MANUAL: 2 % (ref 0.3–6.2)
ERYTHROCYTE [DISTWIDTH] IN BLOOD BY AUTOMATED COUNT: 14.7 % (ref 12.3–15.4)
FIBRINOGEN PPP-MCNC: 413 MG/DL (ref 173–524)
GFR SERPL CREATININE-BSD FRML MDRD: 120 ML/MIN/1.73
GLOBULIN UR ELPH-MCNC: 2.9 GM/DL
GLUCOSE BLD-MCNC: 109 MG/DL (ref 65–99)
GLUCOSE UR STRIP-MCNC: NEGATIVE MG/DL
HCT VFR BLD AUTO: 27.8 % (ref 34–46.6)
HGB BLD-MCNC: 8.7 G/DL (ref 12–15.9)
HGB UR QL STRIP.AUTO: ABNORMAL
HYALINE CASTS UR QL AUTO: ABNORMAL /LPF
HYPOCHROMIA BLD QL: ABNORMAL
INR PPP: 0.88 (ref 0.9–1.1)
KETONES UR QL STRIP: ABNORMAL
LEUKOCYTE ESTERASE UR QL STRIP.AUTO: ABNORMAL
LYMPHOCYTES # BLD MANUAL: 1.76 10*3/MM3 (ref 0.7–3.1)
LYMPHOCYTES NFR BLD MANUAL: 18 % (ref 19.6–45.3)
LYMPHOCYTES NFR BLD MANUAL: 4 % (ref 5–12)
MAGNESIUM SERPL-MCNC: 4.7 MG/DL (ref 1.6–2.6)
MAXIMAL PREDICTED HEART RATE: 193 BPM
MCH RBC QN AUTO: 29.2 PG (ref 26.6–33)
MCHC RBC AUTO-ENTMCNC: 31.3 G/DL (ref 31.5–35.7)
MCV RBC AUTO: 93.3 FL (ref 79–97)
METAMYELOCYTES NFR BLD MANUAL: 2 % (ref 0–0)
MONOCYTES # BLD AUTO: 0.39 10*3/MM3 (ref 0.1–0.9)
MYELOCYTES NFR BLD MANUAL: 1 % (ref 0–0)
NEUTROPHILS # BLD AUTO: 7.15 10*3/MM3 (ref 1.7–7)
NEUTROPHILS NFR BLD MANUAL: 70 % (ref 42.7–76)
NEUTS BAND NFR BLD MANUAL: 3 % (ref 0–5)
NITRITE UR QL STRIP: NEGATIVE
NT-PROBNP SERPL-MCNC: 3766 PG/ML (ref 5–450)
PH UR STRIP.AUTO: 7.5 [PH] (ref 5–8)
PLATELET # BLD AUTO: 349 10*3/MM3 (ref 140–450)
PMV BLD AUTO: 9.5 FL (ref 6–12)
POTASSIUM BLD-SCNC: 4 MMOL/L (ref 3.5–5.2)
PROT SERPL-MCNC: 6.1 G/DL (ref 6–8.5)
PROT UR QL STRIP: ABNORMAL
PROTHROMBIN TIME: 12.3 SECONDS (ref 11–15.4)
RBC # BLD AUTO: 2.98 10*6/MM3 (ref 3.77–5.28)
RBC # UR: ABNORMAL /HPF
REF LAB TEST METHOD: ABNORMAL
SCAN SLIDE: NORMAL
SMALL PLATELETS BLD QL SMEAR: ADEQUATE
SODIUM BLD-SCNC: 142 MMOL/L (ref 136–145)
SP GR UR STRIP: 1.01 (ref 1–1.03)
SQUAMOUS #/AREA URNS HPF: ABNORMAL /HPF
STRESS TARGET HR: 164 BPM
TROPONIN T SERPL-MCNC: <0.01 NG/ML (ref 0–0.03)
URATE SERPL-MCNC: 5 MG/DL (ref 2.4–5.7)
UROBILINOGEN UR QL STRIP: ABNORMAL
WBC NRBC COR # BLD: 9.8 10*3/MM3 (ref 3.4–10.8)
WBC UR QL AUTO: ABNORMAL /HPF

## 2020-03-02 PROCEDURE — G0378 HOSPITAL OBSERVATION PER HR: HCPCS

## 2020-03-02 PROCEDURE — 96375 TX/PRO/DX INJ NEW DRUG ADDON: CPT

## 2020-03-02 PROCEDURE — 80053 COMPREHEN METABOLIC PANEL: CPT | Performed by: NURSE PRACTITIONER

## 2020-03-02 PROCEDURE — 83880 ASSAY OF NATRIURETIC PEPTIDE: CPT | Performed by: NURSE PRACTITIONER

## 2020-03-02 PROCEDURE — 81001 URINALYSIS AUTO W/SCOPE: CPT | Performed by: NURSE PRACTITIONER

## 2020-03-02 PROCEDURE — 93005 ELECTROCARDIOGRAM TRACING: CPT | Performed by: NURSE PRACTITIONER

## 2020-03-02 PROCEDURE — 93306 TTE W/DOPPLER COMPLETE: CPT

## 2020-03-02 PROCEDURE — 85610 PROTHROMBIN TIME: CPT | Performed by: NURSE PRACTITIONER

## 2020-03-02 PROCEDURE — 99204 OFFICE O/P NEW MOD 45 MIN: CPT | Performed by: NURSE PRACTITIONER

## 2020-03-02 PROCEDURE — 96361 HYDRATE IV INFUSION ADD-ON: CPT

## 2020-03-02 PROCEDURE — 51702 INSERT TEMP BLADDER CATH: CPT

## 2020-03-02 PROCEDURE — 96367 TX/PROPH/DG ADDL SEQ IV INF: CPT

## 2020-03-02 PROCEDURE — 25010000002 HYDRALAZINE PER 20 MG: Performed by: NURSE PRACTITIONER

## 2020-03-02 PROCEDURE — 96376 TX/PRO/DX INJ SAME DRUG ADON: CPT

## 2020-03-02 PROCEDURE — 85007 BL SMEAR W/DIFF WBC COUNT: CPT | Performed by: NURSE PRACTITIONER

## 2020-03-02 PROCEDURE — 85730 THROMBOPLASTIN TIME PARTIAL: CPT | Performed by: NURSE PRACTITIONER

## 2020-03-02 PROCEDURE — 84484 ASSAY OF TROPONIN QUANT: CPT | Performed by: NURSE PRACTITIONER

## 2020-03-02 PROCEDURE — 85025 COMPLETE CBC W/AUTO DIFF WBC: CPT | Performed by: NURSE PRACTITIONER

## 2020-03-02 PROCEDURE — 85384 FIBRINOGEN ACTIVITY: CPT | Performed by: NURSE PRACTITIONER

## 2020-03-02 PROCEDURE — 83735 ASSAY OF MAGNESIUM: CPT | Performed by: OBSTETRICS & GYNECOLOGY

## 2020-03-02 PROCEDURE — 25010000002 FUROSEMIDE PER 20 MG: Performed by: NURSE PRACTITIONER

## 2020-03-02 PROCEDURE — 93306 TTE W/DOPPLER COMPLETE: CPT | Performed by: INTERNAL MEDICINE

## 2020-03-02 PROCEDURE — 71045 X-RAY EXAM CHEST 1 VIEW: CPT

## 2020-03-02 PROCEDURE — 99284 EMERGENCY DEPT VISIT MOD MDM: CPT

## 2020-03-02 PROCEDURE — 96365 THER/PROPH/DIAG IV INF INIT: CPT

## 2020-03-02 PROCEDURE — 25010000002 MAGNESIUM SULFATE IN D5W 1G/100ML (PREMIX) 1-5 GM/100ML-% SOLUTION: Performed by: NURSE PRACTITIONER

## 2020-03-02 PROCEDURE — 93010 ELECTROCARDIOGRAM REPORT: CPT | Performed by: INTERNAL MEDICINE

## 2020-03-02 PROCEDURE — 25010000002 FUROSEMIDE PER 20 MG: Performed by: INTERNAL MEDICINE

## 2020-03-02 PROCEDURE — 84550 ASSAY OF BLOOD/URIC ACID: CPT | Performed by: NURSE PRACTITIONER

## 2020-03-02 PROCEDURE — 36415 COLL VENOUS BLD VENIPUNCTURE: CPT | Performed by: OBSTETRICS & GYNECOLOGY

## 2020-03-02 RX ORDER — FUROSEMIDE 10 MG/ML
20 INJECTION INTRAMUSCULAR; INTRAVENOUS ONCE
Status: COMPLETED | OUTPATIENT
Start: 2020-03-02 | End: 2020-03-02

## 2020-03-02 RX ORDER — FERROUS SULFATE 325(65) MG
325 TABLET ORAL DAILY
Status: ON HOLD | COMMUNITY
End: 2021-11-16

## 2020-03-02 RX ORDER — LABETALOL HYDROCHLORIDE 5 MG/ML
5 INJECTION, SOLUTION INTRAVENOUS ONCE
Status: COMPLETED | OUTPATIENT
Start: 2020-03-02 | End: 2020-03-02

## 2020-03-02 RX ORDER — LABETALOL HYDROCHLORIDE 5 MG/ML
10 INJECTION, SOLUTION INTRAVENOUS ONCE
Status: COMPLETED | OUTPATIENT
Start: 2020-03-02 | End: 2020-03-02

## 2020-03-02 RX ORDER — MAGNESIUM SULFATE HEPTAHYDRATE 40 MG/ML
2 INJECTION, SOLUTION INTRAVENOUS CONTINUOUS
Status: DISCONTINUED | OUTPATIENT
Start: 2020-03-02 | End: 2020-03-03 | Stop reason: HOSPADM

## 2020-03-02 RX ORDER — FERROUS SULFATE 325(65) MG
325 TABLET ORAL DAILY
Status: DISCONTINUED | OUTPATIENT
Start: 2020-03-02 | End: 2020-03-03 | Stop reason: HOSPADM

## 2020-03-02 RX ORDER — HYDRALAZINE HYDROCHLORIDE 20 MG/ML
20 INJECTION INTRAMUSCULAR; INTRAVENOUS ONCE
Status: COMPLETED | OUTPATIENT
Start: 2020-03-02 | End: 2020-03-02

## 2020-03-02 RX ORDER — MAGNESIUM SULFATE HEPTAHYDRATE 40 MG/ML
INJECTION, SOLUTION INTRAVENOUS
Status: DISCONTINUED
Start: 2020-03-02 | End: 2020-03-03 | Stop reason: HOSPADM

## 2020-03-02 RX ORDER — ONDANSETRON 2 MG/ML
4 INJECTION INTRAMUSCULAR; INTRAVENOUS EVERY 6 HOURS PRN
Status: DISCONTINUED | OUTPATIENT
Start: 2020-03-02 | End: 2020-03-03 | Stop reason: HOSPADM

## 2020-03-02 RX ORDER — HYDROCHLOROTHIAZIDE 12.5 MG/1
12.5 CAPSULE, GELATIN COATED ORAL DAILY
Status: DISCONTINUED | OUTPATIENT
Start: 2020-03-02 | End: 2020-03-03 | Stop reason: HOSPADM

## 2020-03-02 RX ORDER — ACETAMINOPHEN 325 MG/1
650 TABLET ORAL ONCE
Status: DISCONTINUED | OUTPATIENT
Start: 2020-03-02 | End: 2020-03-02 | Stop reason: HOSPADM

## 2020-03-02 RX ORDER — LIDOCAINE HYDROCHLORIDE 20 MG/ML
JELLY TOPICAL AS NEEDED
Status: DISCONTINUED | OUTPATIENT
Start: 2020-03-02 | End: 2020-03-03 | Stop reason: HOSPADM

## 2020-03-02 RX ORDER — CALCIUM GLUCONATE 94 MG/ML
1 INJECTION, SOLUTION INTRAVENOUS ONCE AS NEEDED
Status: DISCONTINUED | OUTPATIENT
Start: 2020-03-02 | End: 2020-03-03 | Stop reason: HOSPADM

## 2020-03-02 RX ORDER — ACETAMINOPHEN 325 MG/1
650 TABLET ORAL EVERY 4 HOURS PRN
Status: DISCONTINUED | OUTPATIENT
Start: 2020-03-02 | End: 2020-03-03 | Stop reason: HOSPADM

## 2020-03-02 RX ORDER — MAGNESIUM SULFATE 1 G/100ML
3 INJECTION INTRAVENOUS ONCE
Status: COMPLETED | OUTPATIENT
Start: 2020-03-02 | End: 2020-03-02

## 2020-03-02 RX ORDER — IBUPROFEN 800 MG/1
800 TABLET ORAL EVERY 6 HOURS PRN
Status: DISCONTINUED | OUTPATIENT
Start: 2020-03-02 | End: 2020-03-03 | Stop reason: HOSPADM

## 2020-03-02 RX ORDER — MAGNESIUM SULFATE 1 G/100ML
1 INJECTION INTRAVENOUS ONCE
Status: COMPLETED | OUTPATIENT
Start: 2020-03-02 | End: 2020-03-02

## 2020-03-02 RX ORDER — SODIUM CHLORIDE, SODIUM LACTATE, POTASSIUM CHLORIDE, CALCIUM CHLORIDE 600; 310; 30; 20 MG/100ML; MG/100ML; MG/100ML; MG/100ML
75 INJECTION, SOLUTION INTRAVENOUS CONTINUOUS
Status: DISCONTINUED | OUTPATIENT
Start: 2020-03-02 | End: 2020-03-03 | Stop reason: HOSPADM

## 2020-03-02 RX ADMIN — IBUPROFEN 800 MG: 800 TABLET, FILM COATED ORAL at 15:30

## 2020-03-02 RX ADMIN — HYDROCHLOROTHIAZIDE 12.5 MG: 12.5 CAPSULE, GELATIN COATED ORAL at 17:30

## 2020-03-02 RX ADMIN — LABETALOL HYDROCHLORIDE 300 MG: 100 TABLET, FILM COATED ORAL at 21:25

## 2020-03-02 RX ADMIN — FUROSEMIDE 20 MG: 10 INJECTION, SOLUTION INTRAMUSCULAR; INTRAVENOUS at 16:43

## 2020-03-02 RX ADMIN — LABETALOL HYDROCHLORIDE 300 MG: 100 TABLET, FILM COATED ORAL at 09:48

## 2020-03-02 RX ADMIN — FUROSEMIDE 20 MG: 10 INJECTION, SOLUTION INTRAMUSCULAR; INTRAVENOUS at 03:39

## 2020-03-02 RX ADMIN — HYDRALAZINE HYDROCHLORIDE 20 MG: 20 INJECTION INTRAMUSCULAR; INTRAVENOUS at 03:22

## 2020-03-02 RX ADMIN — IBUPROFEN 800 MG: 800 TABLET, FILM COATED ORAL at 05:07

## 2020-03-02 RX ADMIN — LABETALOL HYDROCHLORIDE 5 MG: 5 INJECTION INTRAVENOUS at 01:56

## 2020-03-02 RX ADMIN — ACETAMINOPHEN 650 MG: 325 TABLET ORAL at 09:48

## 2020-03-02 RX ADMIN — SODIUM CHLORIDE, POTASSIUM CHLORIDE, SODIUM LACTATE AND CALCIUM CHLORIDE 75 ML/HR: 600; 310; 30; 20 INJECTION, SOLUTION INTRAVENOUS at 17:33

## 2020-03-02 RX ADMIN — FERROUS SULFATE TAB 325 MG (65 MG ELEMENTAL FE) 325 MG: 325 (65 FE) TAB at 09:48

## 2020-03-02 RX ADMIN — LABETALOL HYDROCHLORIDE 10 MG: 5 INJECTION, SOLUTION INTRAVENOUS at 02:34

## 2020-03-02 RX ADMIN — MAGNESIUM SULFATE IN DEXTROSE 3 G: 10 INJECTION, SOLUTION INTRAVENOUS at 03:40

## 2020-03-02 RX ADMIN — ACETAMINOPHEN 650 MG: 325 TABLET ORAL at 21:22

## 2020-03-02 RX ADMIN — MAGNESIUM SULFATE IN DEXTROSE 1 G: 10 INJECTION, SOLUTION INTRAVENOUS at 01:58

## 2020-03-02 RX ADMIN — POTASSIUM CHLORIDE 30 MEQ: 20 TABLET, EXTENDED RELEASE ORAL at 17:30

## 2020-03-02 RX ADMIN — SODIUM CHLORIDE, POTASSIUM CHLORIDE, SODIUM LACTATE AND CALCIUM CHLORIDE 75 ML/HR: 600; 310; 30; 20 INJECTION, SOLUTION INTRAVENOUS at 04:18

## 2020-03-02 NOTE — H&P
Patient Care Team:  Provider, No Known as PCP - General    Chief complaint elevated blood pressure    Subjective     Patient is a 29 y.o. female  1 para 1 who delivered by spontaneous vaginal delivery 6 days ago presents to the emergency room last night secondary to elevated blood pressure and just not feeling right.  She was found to have severely elevated blood pressures there was started on IV magnesium for seizure prophylaxis.  She was also found to have some pulmonary edema and given Lasix.  She is feeling some better this morning.  She is a little bit short of breath.  Bleeding is minimal.    Review of Systems   Pertinent items are noted in HPI    History  Past Medical History:   Diagnosis Date   • Hyperthyroidism    • Urinary tract infection      Past Surgical History:   Procedure Laterality Date   • WISDOM TOOTH EXTRACTION       Family History   Problem Relation Age of Onset   • Hypertension Father    • Heart valve disorder Father    • Hypertension Paternal Grandmother      Social History     Tobacco Use   • Smoking status: Never Smoker   • Smokeless tobacco: Never Used   Substance Use Topics   • Alcohol use: Not Currently   • Drug use: Never     Medications Prior to Admission   Medication Sig Dispense Refill Last Dose   • ferrous sulfate 325 (65 FE) MG tablet Take 325 mg by mouth Daily.   3/1/2020 at 1000   • ibuprofen (ADVIL,MOTRIN) 800 MG tablet Take 1 tablet by mouth Every 6 (Six) Hours As Needed for Mild Pain . 30 tablet 0 3/1/2020 at 1500     Allergies:  Patient has no known allergies.    Objective     Vital Signs  Temp:  [98.1 °F (36.7 °C)-98.8 °F (37.1 °C)] 98.8 °F (37.1 °C)  Heart Rate:  [] 97  Resp:  [18-20] 20  BP: (114-164)/() 130/85    Physical Exam:      General Appearance:    Alert, cooperative, in no acute distress   Head:    Normocephalic, without obvious abnormality, atraumatic   Eyes:            Lids and lashes normal, conjunctivae and sclerae normal, no    icterus, no pallor, corneas clear, PERRLA   Ears:    Ears appear intact with no abnormalities noted   Throat:   No oral lesions, no thrush, oral mucosa moist   Neck:   No adenopathy, supple, trachea midline, no thyromegaly, no     carotid bruit, no JVD   Back:     No kyphosis present, no scoliosis present, no skin lesions,       erythema or scars, no tenderness to percussion or                   palpation,   range of motion normal   Lungs:     Clear to auscultation,respirations regular, even and                   Unlabored.  Decreased bases.    Heart:    Regular rhythm and normal rate, normal S1 and S2, no            murmur, no gallop, no rub, no click   Breast Exam:    Deferred   Abdomen:     Normal bowel sounds, no masses, no organomegaly, soft        non-tender, non-distended, no guarding, no rebound                 tenderness   Genitalia:    Deferred   Extremities:   Moves all extremities well, no edema, no cyanosis, no              redness   Pulses:   Pulses palpable and equal bilaterally   Skin:   No bleeding, bruising or rash   Lymph nodes:   No palpable adenopathy           Results Review:    I reviewed the patient's new clinical results.    Assessment/Plan     Postpartum preeclampsia-continue IV magnesium until 24 hour versus finished.  Pulmonary edema-check echocardiogram and get cardiology consult.        I discussed the patients findings and my recommendations with patient.     Karel Salinas,   03/02/20  4:51 PM

## 2020-03-02 NOTE — CONSULTS
Date of Admit: 3/2/2020  Date of Consult: 03/02/20  Chloe Reagan*        * No active hospital problems. *      Assessment      1. Heart failure with preserved LV ejection fraction/acute diastolic heart failure  2. Systemic hypertension with a recent preeclampsia.  Patient is getting IV magnesium.  3. 6 days postpartum status post vaginal delivery with preserved LV systolic function.    Recommendations     1. Add HCTZ 12.5 mg daily for now.  2. Give another dose of IV furosemide today.  3. Place her on potassium replacement protocol.    Reason for consultation: Postpartum Pulmonary Edema     Subjective     Subjective     History of Present Illness   Urvashi Cano is a 29 year old female returns for 6 days postpartum vaginal delivery.  Past medical history significant for hyperthyroidism and preeclampsia.  Patient was induced at 37 weeks for preeclampsia. She states she has been doing well up until 3 days ago when she developed shortness of breath and pedal edema. Her blood pressure has also been elevated. She reports nausea and anorexia. She denies any history of coronary artery disease or heart failure. She is not a smoker. Denies increased salt intake. She did receive one dose of IV lasix in the ED and has had good urine output.     Cardiac risk factors:hypertension    Last Echo: 3/2/2020  · Normal left ventricular cavity size and wall thickness noted. All left ventricular wall segments contract normally.  · Left ventricular systolic function is low normal.  · Estimated EF appears to be in the range of 51 - 55%.  · The aortic valve is structurally normal. No aortic valve regurgitation is present. No aortic valve stenosis is present.  · The mitral valve is normal in structure. Mild mitral valve regurgitation is present. No significant mitral valve stenosis is present.  · The tricuspid valve is normal. No tricuspid valve regurgitation is present.  · There is a small (<1cm) circumferential pericardial  effusion. There is no evidence of cardiac tamponade.    Past Medical History:   Diagnosis Date   • Hyperthyroidism    • Urinary tract infection      Past Surgical History:   Procedure Laterality Date   • WISDOM TOOTH EXTRACTION  2014     Family History   Problem Relation Age of Onset   • Hypertension Father    • Heart valve disorder Father    • Hypertension Paternal Grandmother      Social History     Tobacco Use   • Smoking status: Never Smoker   • Smokeless tobacco: Never Used   Substance Use Topics   • Alcohol use: Not Currently   • Drug use: Never     Medications Prior to Admission   Medication Sig Dispense Refill Last Dose   • ferrous sulfate 325 (65 FE) MG tablet Take 325 mg by mouth Daily.   3/1/2020 at 1000   • ibuprofen (ADVIL,MOTRIN) 800 MG tablet Take 1 tablet by mouth Every 6 (Six) Hours As Needed for Mild Pain . 30 tablet 0 3/1/2020 at 1500     Allergies:  Patient has no known allergies.    Review of Systems   Constitutional: Positive for fatigue. Negative for chills, diaphoresis and fever.   HENT: Negative for congestion and trouble swallowing.    Eyes: Negative for photophobia and visual disturbance.   Respiratory: Positive for shortness of breath. Negative for cough, chest tightness and wheezing.    Cardiovascular: Positive for leg swelling. Negative for chest pain and palpitations.   Gastrointestinal: Positive for nausea. Negative for abdominal distention, abdominal pain and vomiting.   Endocrine: Negative for polyphagia and polyuria.   Genitourinary: Negative for dysuria and hematuria.   Musculoskeletal: Negative for neck pain and neck stiffness.   Skin: Negative for rash and wound.   Allergic/Immunologic: Negative for food allergies and immunocompromised state.   Neurological: Negative for dizziness, syncope and weakness.   Hematological: Does not bruise/bleed easily.   Psychiatric/Behavioral: Negative for confusion and suicidal ideas.     Objective       Objective      Vital Signs  Temp:  [98.1  °F (36.7 °C)-98.8 °F (37.1 °C)] 98.8 °F (37.1 °C)  Heart Rate:  [100-126] 105  Resp:  [18] 18  BP: (136-164)/() 143/82     Vital Signs (last 72 hrs)       02/28 0700  -  02/29 0659 02/29 0700  -  03/01 0659 03/01 0700  -  03/02 0659 03/02 0700  -  03/02 1003   Most Recent    Temp (°F)         98.8     98.8 (37.1)    Heart Rate     105 -  122    100 -  112     105    BP     143/95 -  155/94    140/89 -  150/83     143/82    SpO2 (%)     93 -  96    93 -  96     96        There is no height or weight on file to calculate BMI.    Intake/Output Summary (Last 24 hours) at 3/2/2020 1003  Last data filed at 3/2/2020 0720  Gross per 24 hour   Intake --   Output 2045 ml   Net -2045 ml     Physical Exam   Constitutional: She is oriented to person, place, and time. She appears well-developed and well-nourished.   HENT:   Head: Normocephalic and atraumatic.   Neck: Normal range of motion.   Cardiovascular: Normal rate, regular rhythm and normal heart sounds.   No murmur heard.  Pulmonary/Chest: Effort normal. No respiratory distress. She has no wheezes. She has rales (Rales at right base.).   Abdominal: Soft. Bowel sounds are normal. She exhibits no distension. There is no tenderness.   Musculoskeletal: She exhibits edema (1+ BLE).   Neurological: She is alert and oriented to person, place, and time.   Psychiatric: She has a normal mood and affect. Her behavior is normal.     Results review     Results Review:    I reviewed the patient's new clinical results.  Results from last 7 days   Lab Units 03/02/20  0158   TROPONIN T ng/mL <0.010     Results from last 7 days   Lab Units 03/02/20  0158 02/26/20  0723 02/24/20  1152   WBC 10*3/mm3 9.80 8.79 6.91   HEMOGLOBIN g/dL 8.7* 8.5* 11.6*   PLATELETS 10*3/mm3 349 173 177     Results from last 7 days   Lab Units 03/02/20  0158 02/24/20  1152   SODIUM mmol/L 142 137   POTASSIUM mmol/L 4.0 3.9   CHLORIDE mmol/L 110* 108*   CO2 mmol/L 20.0* 17.5*   BUN mg/dL 10 10   CREATININE  mg/dL 0.60 0.57   CALCIUM mg/dL 8.1* 8.7   GLUCOSE mg/dL 109* 113*   ALT (SGPT) U/L 87* 29   AST (SGOT) U/L 46* 30     Lab Results   Component Value Date    INR 0.88 (L) 03/02/2020     Lab Results   Component Value Date    MG 4.7 (H) 03/02/2020     No results found for: TSH, PSA, CHLPL, TRIG, HDL, LDL   Lab Results   Component Value Date    PROBNP 3,766.0 (H) 03/02/2020       ECG      ECG/EMG Results (last 24 hours)     Procedure Component Value Units Date/Time    Adult Transthoracic Echo Complete W/ Cont if Necessary Per Protocol [[id number]] Resulted:  03/02/20 0948     Updated:  03/02/20 0948        Imaging Results (Last 72 Hours)     ** No results found for the last 72 hours. **          I have discussed my impression and recommendations with the patient and family.    Thank you very much for asking us to be involved in this patient's care.  We will follow along with you.      SHANICE Dillard  03/02/20  10:03 AM    I, Bill Chacon [facility], [facility], personally performed the services described in this documentation as documented by the above named individual under my supervision and made necessary changes and the note is both accurate and complete.     Bill Chacon MD, FACC  3/2/2020  3:30 PM  Please note that portions of this note were completed with a voice recognition program.

## 2020-03-02 NOTE — PLAN OF CARE
Problem: Patient Care Overview  Goal: Plan of Care Review  Outcome: Ongoing (interventions implemented as appropriate)  Flowsheets (Taken 3/2/2020 1812)  Progress: improving  Plan of Care Reviewed With: patient; spouse     Pt resting well, SOA improved

## 2020-03-02 NOTE — ED PROVIDER NOTES
Subjective   The patient is a 29 year old female that presents to ED for high blood pressure. She is 6 days PP vaginal delivery. She was induced at 37 weeks for preeclampsia. Her BP had been normal up until 3 days ago where it has been gradually rising. The patient is now complaining of lower extremity edema, mild SOA, and elevated BPs. She also reports nausea and anorexia.       History provided by:  Patient   used: No    Hypertension   Severity:  Moderate  Onset quality:  Gradual  Duration:  3 days  Timing:  Constant  Progression:  Worsening  Chronicity:  New  Notable PTA blood pressures:  160/100  Relieved by:  Nothing  Worsened by:  Nothing  Ineffective treatments:  None tried  Associated symptoms: peripheral edema and shortness of breath    Risk factors comment:  Post partum      Review of Systems   Constitutional: Negative.    HENT: Negative.    Eyes: Negative.    Respiratory: Positive for shortness of breath.    Cardiovascular: Positive for leg swelling.   Gastrointestinal: Negative.    Endocrine: Negative.    Genitourinary: Negative.    Musculoskeletal: Negative.    Skin: Negative.    Allergic/Immunologic: Negative.    Neurological: Negative.    Hematological: Negative.    Psychiatric/Behavioral: Negative.    All other systems reviewed and are negative.      Past Medical History:   Diagnosis Date   • Hyperthyroidism    • Urinary tract infection        No Known Allergies    Past Surgical History:   Procedure Laterality Date   • WISDOM TOOTH EXTRACTION  2014       Family History   Problem Relation Age of Onset   • Hypertension Father    • Heart valve disorder Father    • Hypertension Paternal Grandmother        Social History     Socioeconomic History   • Marital status: Single     Spouse name: Not on file   • Number of children: Not on file   • Years of education: Not on file   • Highest education level: Not on file   Tobacco Use   • Smoking status: Never Smoker   • Smokeless tobacco:  Never Used   Substance and Sexual Activity   • Alcohol use: Not Currently   • Drug use: Never           Objective   Physical Exam   Constitutional: She is oriented to person, place, and time. She appears well-developed and well-nourished.   HENT:   Head: Normocephalic.   Nose: Nose normal.   Eyes: Pupils are equal, round, and reactive to light. EOM are normal.   Neck: Normal range of motion. Neck supple.   Cardiovascular: Regular rhythm, normal heart sounds and intact distal pulses.   tachycardic   Pulmonary/Chest: Effort normal and breath sounds normal.   Abdominal: Soft. Bowel sounds are normal.   Musculoskeletal: She exhibits edema.   2+ pitting edema bilateral lower extremity   Neurological: She is alert and oriented to person, place, and time.   Skin: Skin is warm. Capillary refill takes less than 2 seconds.   Psychiatric: She has a normal mood and affect. Her behavior is normal. Judgment and thought content normal.   Nursing note and vitals reviewed.      Procedures           ED Course                                           MDM  Number of Diagnoses or Management Options  Acute pulmonary edema (CMS/HCC): new and requires workup  Hypertension, unspecified type: new and requires workup  Preeclampsia in postpartum period: new and requires workup     Amount and/or Complexity of Data Reviewed  Clinical lab tests: ordered and reviewed  Tests in the radiology section of CPT®: reviewed and ordered  Tests in the medicine section of CPT®: reviewed and ordered  Decide to obtain previous medical records or to obtain history from someone other than the patient: yes    Risk of Complications, Morbidity, and/or Mortality  Presenting problems: moderate  Diagnostic procedures: moderate  Management options: moderate  General comments: Admitted to labor and delivery for further evaluation and treatment    Patient Progress  Patient progress: improved      Final diagnoses:   Preeclampsia in postpartum period   Hypertension,  unspecified type   Acute pulmonary edema (CMS/HCC)            Ella Ross, APRN  03/02/20 0338

## 2020-03-03 VITALS
RESPIRATION RATE: 18 BRPM | HEIGHT: 62 IN | BODY MASS INDEX: 41.41 KG/M2 | DIASTOLIC BLOOD PRESSURE: 98 MMHG | OXYGEN SATURATION: 95 % | HEART RATE: 99 BPM | TEMPERATURE: 98.6 F | WEIGHT: 225 LBS | SYSTOLIC BLOOD PRESSURE: 143 MMHG

## 2020-03-03 LAB
ANION GAP SERPL CALCULATED.3IONS-SCNC: 14.4 MMOL/L (ref 5–15)
BUN BLD-MCNC: 12 MG/DL (ref 6–20)
BUN/CREAT SERPL: 18.5 (ref 7–25)
CALCIUM SPEC-SCNC: 7.2 MG/DL (ref 8.6–10.5)
CHLORIDE SERPL-SCNC: 104 MMOL/L (ref 98–107)
CO2 SERPL-SCNC: 20.6 MMOL/L (ref 22–29)
CREAT BLD-MCNC: 0.65 MG/DL (ref 0.57–1)
GFR SERPL CREATININE-BSD FRML MDRD: 109 ML/MIN/1.73
GLUCOSE BLD-MCNC: 105 MG/DL (ref 65–99)
POTASSIUM BLD-SCNC: 3.7 MMOL/L (ref 3.5–5.2)
SODIUM BLD-SCNC: 139 MMOL/L (ref 136–145)

## 2020-03-03 PROCEDURE — 80048 BASIC METABOLIC PNL TOTAL CA: CPT | Performed by: INTERNAL MEDICINE

## 2020-03-03 PROCEDURE — G0378 HOSPITAL OBSERVATION PER HR: HCPCS

## 2020-03-03 RX ORDER — HYDROCHLOROTHIAZIDE 12.5 MG/1
12.5 CAPSULE, GELATIN COATED ORAL EVERY MORNING
Qty: 30 CAPSULE | Refills: 0 | Status: ON HOLD | OUTPATIENT
Start: 2020-03-03 | End: 2021-11-16

## 2020-03-03 RX ORDER — LABETALOL 300 MG/1
300 TABLET, FILM COATED ORAL 3 TIMES DAILY
Qty: 90 TABLET | Refills: 0 | Status: ON HOLD | OUTPATIENT
Start: 2020-03-03 | End: 2021-11-16

## 2020-03-03 RX ADMIN — HYDROCHLOROTHIAZIDE 12.5 MG: 12.5 CAPSULE, GELATIN COATED ORAL at 08:34

## 2020-03-03 RX ADMIN — LABETALOL HYDROCHLORIDE 300 MG: 100 TABLET, FILM COATED ORAL at 08:34

## 2020-03-03 RX ADMIN — ACETAMINOPHEN 650 MG: 325 TABLET ORAL at 07:39

## 2020-03-03 RX ADMIN — FERROUS SULFATE TAB 325 MG (65 MG ELEMENTAL FE) 325 MG: 325 (65 FE) TAB at 08:34

## 2020-03-03 NOTE — DISCHARGE SUMMARY
SUSAN Rivera  Delivery Discharge Summary    Primary OB Clinician:     EDC: Estimated Date of Delivery: 3/16/20    Gestational Age:37w1d    Antepartum complications: pregnancy-induced hypertension    Date of Delivery: 2020   Time of Delivery: 2:48 PM     Delivered By:  Karel Salinas     Delivery Type: Vaginal, Spontaneous      Tubal Ligation: n/a    Baby:@BABYNOHDR(SEX)@ infant;   Apgar:  8   @ 1 minute /   Apgar:  9   @ 5 minutes   Weight: @BABYNOHDR(BIRTHWEIGHT)@     Anesthesia: Epidural      Intrapartum complications: PIH    [unfilled]       Lab Results   Component Value Date    ABO O 2020    RH Positive 2020        Lab Results   Component Value Date    HGB 8.7 (L) 2020    HCT 27.8 (L) 2020         Discharge Date: 3/3/2020; Discharge Time: 8:54 AM        Plan:    Follow-up appointment with [facility] in 1 weeks. Pt s/p magnesium for postpartum preeclampsia.      Chloe Reagan DO  3/3/2020  8:54 AM

## 2020-11-12 ENCOUNTER — TRANSCRIBE ORDERS (OUTPATIENT)
Dept: ADMINISTRATIVE | Facility: HOSPITAL | Age: 30
End: 2020-11-12

## 2020-11-12 ENCOUNTER — HOSPITAL ENCOUNTER (OUTPATIENT)
Dept: GENERAL RADIOLOGY | Facility: HOSPITAL | Age: 30
Discharge: HOME | End: 2020-11-12
Admitting: NURSE PRACTITIONER

## 2020-11-12 DIAGNOSIS — M25.531 RIGHT WRIST PAIN: ICD-10-CM

## 2020-11-12 DIAGNOSIS — M25.531 RIGHT WRIST PAIN: Primary | ICD-10-CM

## 2020-11-12 PROCEDURE — 73110 X-RAY EXAM OF WRIST: CPT | Performed by: RADIOLOGY

## 2020-11-12 PROCEDURE — 73110 X-RAY EXAM OF WRIST: CPT

## 2021-03-16 ENCOUNTER — IMMUNIZATION (OUTPATIENT)
Dept: VACCINE CLINIC | Facility: HOSPITAL | Age: 31
End: 2021-03-16

## 2021-03-16 PROCEDURE — 91300 HC SARSCOV02 VAC 30MCG/0.3ML IM: CPT | Performed by: INTERNAL MEDICINE

## 2021-03-16 PROCEDURE — 0001A: CPT | Performed by: INTERNAL MEDICINE

## 2021-04-06 ENCOUNTER — IMMUNIZATION (OUTPATIENT)
Dept: VACCINE CLINIC | Facility: HOSPITAL | Age: 31
End: 2021-04-06

## 2021-04-06 PROCEDURE — 0002A: CPT | Performed by: INTERNAL MEDICINE

## 2021-04-06 PROCEDURE — 91300 HC SARSCOV02 VAC 30MCG/0.3ML IM: CPT | Performed by: INTERNAL MEDICINE

## 2021-11-06 ENCOUNTER — APPOINTMENT (OUTPATIENT)
Dept: ULTRASOUND IMAGING | Facility: HOSPITAL | Age: 31
End: 2021-11-06

## 2021-11-06 ENCOUNTER — HOSPITAL ENCOUNTER (EMERGENCY)
Facility: HOSPITAL | Age: 31
Discharge: HOME | End: 2021-11-06
Attending: FAMILY MEDICINE | Admitting: FAMILY MEDICINE

## 2021-11-06 VITALS
HEIGHT: 66 IN | SYSTOLIC BLOOD PRESSURE: 133 MMHG | BODY MASS INDEX: 31.82 KG/M2 | DIASTOLIC BLOOD PRESSURE: 76 MMHG | TEMPERATURE: 98.2 F | RESPIRATION RATE: 18 BRPM | OXYGEN SATURATION: 98 % | HEART RATE: 100 BPM | WEIGHT: 198 LBS

## 2021-11-06 DIAGNOSIS — O20.0 THREATENED ABORTION: Primary | ICD-10-CM

## 2021-11-06 LAB
ALBUMIN SERPL-MCNC: 4.78 G/DL (ref 3.5–5.2)
ALBUMIN/GLOB SERPL: 1.8 G/DL
ALP SERPL-CCNC: 92 U/L (ref 39–117)
ALT SERPL W P-5'-P-CCNC: 30 U/L (ref 1–33)
ANION GAP SERPL CALCULATED.3IONS-SCNC: 14.7 MMOL/L (ref 5–15)
AST SERPL-CCNC: 24 U/L (ref 1–32)
BACTERIA UR QL AUTO: ABNORMAL /HPF
BASOPHILS # BLD AUTO: 0.06 10*3/MM3 (ref 0–0.2)
BASOPHILS NFR BLD AUTO: 0.7 % (ref 0–1.5)
BILIRUB SERPL-MCNC: 0.2 MG/DL (ref 0–1.2)
BILIRUB UR QL STRIP: NEGATIVE
BUN SERPL-MCNC: 10 MG/DL (ref 6–20)
BUN/CREAT SERPL: 13.3 (ref 7–25)
CALCIUM SPEC-SCNC: 9.3 MG/DL (ref 8.6–10.5)
CHLORIDE SERPL-SCNC: 104 MMOL/L (ref 98–107)
CLARITY UR: CLEAR
CO2 SERPL-SCNC: 20.3 MMOL/L (ref 22–29)
COLOR UR: ABNORMAL
CREAT SERPL-MCNC: 0.75 MG/DL (ref 0.57–1)
DEPRECATED RDW RBC AUTO: 45.4 FL (ref 37–54)
EOSINOPHIL # BLD AUTO: 0.11 10*3/MM3 (ref 0–0.4)
EOSINOPHIL NFR BLD AUTO: 1.3 % (ref 0.3–6.2)
ERYTHROCYTE [DISTWIDTH] IN BLOOD BY AUTOMATED COUNT: 14.6 % (ref 12.3–15.4)
GFR SERPL CREATININE-BSD FRML MDRD: 91 ML/MIN/1.73
GLOBULIN UR ELPH-MCNC: 2.7 GM/DL
GLUCOSE SERPL-MCNC: 115 MG/DL (ref 65–99)
GLUCOSE UR STRIP-MCNC: NEGATIVE MG/DL
HCG INTACT+B SERPL-ACNC: 338.5 MIU/ML
HCT VFR BLD AUTO: 40.6 % (ref 34–46.6)
HGB BLD-MCNC: 12.7 G/DL (ref 12–15.9)
HGB UR QL STRIP.AUTO: ABNORMAL
HYALINE CASTS UR QL AUTO: ABNORMAL /LPF
IMM GRANULOCYTES # BLD AUTO: 0.05 10*3/MM3 (ref 0–0.05)
IMM GRANULOCYTES NFR BLD AUTO: 0.6 % (ref 0–0.5)
KETONES UR QL STRIP: ABNORMAL
LEUKOCYTE ESTERASE UR QL STRIP.AUTO: ABNORMAL
LYMPHOCYTES # BLD AUTO: 1.78 10*3/MM3 (ref 0.7–3.1)
LYMPHOCYTES NFR BLD AUTO: 20.5 % (ref 19.6–45.3)
MCH RBC QN AUTO: 26.6 PG (ref 26.6–33)
MCHC RBC AUTO-ENTMCNC: 31.3 G/DL (ref 31.5–35.7)
MCV RBC AUTO: 84.9 FL (ref 79–97)
MONOCYTES # BLD AUTO: 0.52 10*3/MM3 (ref 0.1–0.9)
MONOCYTES NFR BLD AUTO: 6 % (ref 5–12)
NEUTROPHILS NFR BLD AUTO: 6.17 10*3/MM3 (ref 1.7–7)
NEUTROPHILS NFR BLD AUTO: 70.9 % (ref 42.7–76)
NITRITE UR QL STRIP: NEGATIVE
NRBC BLD AUTO-RTO: 0 /100 WBC (ref 0–0.2)
PH UR STRIP.AUTO: 6.5 [PH] (ref 5–8)
PLATELET # BLD AUTO: 333 10*3/MM3 (ref 140–450)
PMV BLD AUTO: 9.3 FL (ref 6–12)
POTASSIUM SERPL-SCNC: 4.3 MMOL/L (ref 3.5–5.2)
PROT SERPL-MCNC: 7.5 G/DL (ref 6–8.5)
PROT UR QL STRIP: ABNORMAL
RBC # BLD AUTO: 4.78 10*6/MM3 (ref 3.77–5.28)
RBC # UR: ABNORMAL /HPF
REF LAB TEST METHOD: ABNORMAL
SODIUM SERPL-SCNC: 139 MMOL/L (ref 136–145)
SP GR UR STRIP: 1.03 (ref 1–1.03)
SQUAMOUS #/AREA URNS HPF: ABNORMAL /HPF
UROBILINOGEN UR QL STRIP: ABNORMAL
WBC # BLD AUTO: 8.69 10*3/MM3 (ref 3.4–10.8)
WBC UR QL AUTO: ABNORMAL /HPF

## 2021-11-06 PROCEDURE — 80053 COMPREHEN METABOLIC PANEL: CPT | Performed by: PHYSICIAN ASSISTANT

## 2021-11-06 PROCEDURE — 81001 URINALYSIS AUTO W/SCOPE: CPT | Performed by: PHYSICIAN ASSISTANT

## 2021-11-06 PROCEDURE — 85025 COMPLETE CBC W/AUTO DIFF WBC: CPT | Performed by: PHYSICIAN ASSISTANT

## 2021-11-06 PROCEDURE — 84702 CHORIONIC GONADOTROPIN TEST: CPT | Performed by: PHYSICIAN ASSISTANT

## 2021-11-06 PROCEDURE — 99283 EMERGENCY DEPT VISIT LOW MDM: CPT

## 2021-11-06 PROCEDURE — 76817 TRANSVAGINAL US OBSTETRIC: CPT

## 2021-11-06 PROCEDURE — 96376 TX/PRO/DX INJ SAME DRUG ADON: CPT

## 2021-11-06 NOTE — ED PROVIDER NOTES
Subjective   Patient is a healthy 31-year-old female that presents to the ED with complaints of vaginal bleeding and left pelvic pain.  She states she is approximately 5 weeks pregnant.  She states that it is a normal to heavy bleeding.  She states that some bright red.  She is also having a cramping and severe pain in the left pelvic region.  She denies chest pain, shortness of breath, fever, chills, nausea, vomiting, headache, dizziness, abdominal pain, dysuria, diarrhea, or constipation.  She states that she has never had any previous miscarriages or ectopic pregnancies      History provided by:  Patient   used: No        Review of Systems   Constitutional: Negative.  Negative for chills, diaphoresis, fatigue and fever.   HENT: Negative.  Negative for congestion, ear discharge, ear pain, facial swelling, nosebleeds, postnasal drip, rhinorrhea, sinus pressure, sinus pain, sneezing, sore throat, tinnitus and trouble swallowing.    Eyes: Negative.  Negative for photophobia, pain, discharge, redness and itching.   Respiratory: Negative.  Negative for cough, shortness of breath and wheezing.    Cardiovascular: Negative.  Negative for chest pain.   Gastrointestinal: Negative.  Negative for abdominal distention, abdominal pain, constipation, diarrhea, nausea and vomiting.   Endocrine: Negative.    Genitourinary: Positive for pelvic pain and vaginal bleeding. Negative for difficulty urinating, dysuria, flank pain and frequency.   Musculoskeletal: Negative.  Negative for arthralgias, back pain, gait problem, joint swelling, myalgias, neck pain and neck stiffness.   Skin: Negative.    Neurological: Negative.  Negative for dizziness, tremors, seizures, syncope, facial asymmetry, speech difficulty, weakness, light-headedness, numbness and headaches.   Psychiatric/Behavioral: Negative.  Negative for confusion.   All other systems reviewed and are negative.      Past Medical History:   Diagnosis Date   •  Hyperthyroidism    • Urinary tract infection        No Known Allergies    Past Surgical History:   Procedure Laterality Date   • WISDOM TOOTH EXTRACTION  2014       Family History   Problem Relation Age of Onset   • Hypertension Father    • Heart valve disorder Father    • Hypertension Paternal Grandmother        Social History     Socioeconomic History   • Marital status: Single   Tobacco Use   • Smoking status: Never Smoker   • Smokeless tobacco: Never Used   Substance and Sexual Activity   • Alcohol use: Not Currently   • Drug use: Never           Objective   Physical Exam  Vitals and nursing note reviewed.   Constitutional:       General: She is not in acute distress.     Appearance: She is well-developed. She is not diaphoretic.   HENT:      Head: Normocephalic and atraumatic.      Right Ear: External ear normal.      Left Ear: External ear normal.      Nose: Nose normal.      Mouth/Throat:      Mouth: Mucous membranes are moist.      Pharynx: Oropharynx is clear.   Eyes:      Extraocular Movements: Extraocular movements intact.      Conjunctiva/sclera: Conjunctivae normal.      Pupils: Pupils are equal, round, and reactive to light.   Neck:      Vascular: No JVD.      Trachea: No tracheal deviation.   Cardiovascular:      Rate and Rhythm: Normal rate and regular rhythm.      Pulses: Normal pulses.      Heart sounds: Normal heart sounds. No murmur heard.      Pulmonary:      Effort: Pulmonary effort is normal. No respiratory distress.      Breath sounds: Normal breath sounds. No wheezing.   Abdominal:      General: Bowel sounds are normal. There is no distension.      Palpations: Abdomen is soft.      Tenderness: There is no abdominal tenderness. There is no guarding or rebound.   Musculoskeletal:         General: No deformity. Normal range of motion.      Cervical back: Normal range of motion and neck supple.      Right lower leg: No edema.      Left lower leg: No edema.   Skin:     General: Skin is warm and  dry.      Coloration: Skin is not pale.      Findings: No erythema or rash.   Neurological:      General: No focal deficit present.      Mental Status: She is alert and oriented to person, place, and time.      Cranial Nerves: No cranial nerve deficit.   Psychiatric:         Mood and Affect: Mood normal.         Behavior: Behavior normal.         Thought Content: Thought content normal.         Procedures           ED Course  ED Course as of 21   Sat  US Ob Transvaginal  IMPRESSION:  Gestational sac is visualized for an estimated gestational age of 4 weeks and 6 days. A yolk sac is not visualized. Gestational sac has an abnormal morphology and the endometrium is significantly thickened with hyperechoic material/blood product. The  findings are concerning for potential miscarriage. This was discussed with Dr. Banda at the time of dictation.     Signer Name: Karina Latif MD   Signed: 2021 10:05 PM []    Discussed plan of care with patient.  Advised if symptoms worsen return to the ED.  Advised to follow-up on Monday with her OB/GYN. []      ED Course User Index  [] Edelmira Martinez PA-C                                           [facility]    Final diagnoses:   Threatened        ED Disposition  ED Disposition     ED Disposition Condition Comment    Discharge Stable           Chloe Reagan, DO  [address] [facility]  [id number]  [place] [id number] [phone]    Schedule an appointment as soon as possible for a visit in 1 day           Medication List      No changes were made to your prescriptions during this visit.          Edelmira Martinez PA-C  21

## 2021-11-13 ENCOUNTER — HOSPITAL ENCOUNTER (EMERGENCY)
Facility: HOSPITAL | Age: 31
Discharge: HOME | End: 2021-11-13
Attending: FAMILY MEDICINE | Admitting: FAMILY MEDICINE

## 2021-11-13 ENCOUNTER — APPOINTMENT (OUTPATIENT)
Dept: ULTRASOUND IMAGING | Facility: HOSPITAL | Age: 31
End: 2021-11-13

## 2021-11-13 VITALS
OXYGEN SATURATION: 98 % | SYSTOLIC BLOOD PRESSURE: 122 MMHG | HEART RATE: 96 BPM | BODY MASS INDEX: 31.82 KG/M2 | TEMPERATURE: 98.4 F | HEIGHT: 66 IN | WEIGHT: 198 LBS | DIASTOLIC BLOOD PRESSURE: 84 MMHG | RESPIRATION RATE: 18 BRPM

## 2021-11-13 DIAGNOSIS — O00.90 ECTOPIC PREGNANCY WITHOUT INTRAUTERINE PREGNANCY, UNSPECIFIED LOCATION: Primary | ICD-10-CM

## 2021-11-13 LAB
ALBUMIN SERPL-MCNC: 4.5 G/DL (ref 3.5–5.2)
ALBUMIN/GLOB SERPL: 1.6 G/DL
ALP SERPL-CCNC: 85 U/L (ref 39–117)
ALT SERPL W P-5'-P-CCNC: 33 U/L (ref 1–33)
ANION GAP SERPL CALCULATED.3IONS-SCNC: 13.5 MMOL/L (ref 5–15)
AST SERPL-CCNC: 18 U/L (ref 1–32)
BACTERIA UR QL AUTO: ABNORMAL /HPF
BASOPHILS # BLD AUTO: 0.06 10*3/MM3 (ref 0–0.2)
BASOPHILS NFR BLD AUTO: 0.7 % (ref 0–1.5)
BILIRUB SERPL-MCNC: 0.4 MG/DL (ref 0–1.2)
BILIRUB UR QL STRIP: NEGATIVE
BUN SERPL-MCNC: 8 MG/DL (ref 6–20)
BUN/CREAT SERPL: 14 (ref 7–25)
CALCIUM SPEC-SCNC: 8.8 MG/DL (ref 8.6–10.5)
CHLORIDE SERPL-SCNC: 105 MMOL/L (ref 98–107)
CLARITY UR: ABNORMAL
CO2 SERPL-SCNC: 18.5 MMOL/L (ref 22–29)
COLOR UR: YELLOW
CREAT SERPL-MCNC: 0.57 MG/DL (ref 0.57–1)
DEPRECATED RDW RBC AUTO: 45.1 FL (ref 37–54)
EOSINOPHIL # BLD AUTO: 0.23 10*3/MM3 (ref 0–0.4)
EOSINOPHIL NFR BLD AUTO: 2.8 % (ref 0.3–6.2)
ERYTHROCYTE [DISTWIDTH] IN BLOOD BY AUTOMATED COUNT: 14.7 % (ref 12.3–15.4)
FLUAV SUBTYP SPEC NAA+PROBE: NOT DETECTED
FLUBV RNA ISLT QL NAA+PROBE: NOT DETECTED
GFR SERPL CREATININE-BSD FRML MDRD: 125 ML/MIN/1.73
GLOBULIN UR ELPH-MCNC: 2.9 GM/DL
GLUCOSE SERPL-MCNC: 107 MG/DL (ref 65–99)
GLUCOSE UR STRIP-MCNC: NEGATIVE MG/DL
HCG INTACT+B SERPL-ACNC: 1192 MIU/ML
HCT VFR BLD AUTO: 38 % (ref 34–46.6)
HGB BLD-MCNC: 12.1 G/DL (ref 12–15.9)
HGB UR QL STRIP.AUTO: ABNORMAL
HOLD SPECIMEN: NORMAL
HOLD SPECIMEN: NORMAL
HYALINE CASTS UR QL AUTO: ABNORMAL /LPF
HYDATID CYST SPEC WET PREP: ABNORMAL
IMM GRANULOCYTES # BLD AUTO: 0.06 10*3/MM3 (ref 0–0.05)
IMM GRANULOCYTES NFR BLD AUTO: 0.7 % (ref 0–0.5)
INR PPP: 0.92 (ref 0.9–1.1)
KETONES UR QL STRIP: NEGATIVE
KOH PREP NAIL: NORMAL
LEUKOCYTE ESTERASE UR QL STRIP.AUTO: ABNORMAL
LIPASE SERPL-CCNC: 29 U/L (ref 13–60)
LYMPHOCYTES # BLD AUTO: 2.37 10*3/MM3 (ref 0.7–3.1)
LYMPHOCYTES NFR BLD AUTO: 28.3 % (ref 19.6–45.3)
MAGNESIUM SERPL-MCNC: 2 MG/DL (ref 1.6–2.6)
MCH RBC QN AUTO: 26.9 PG (ref 26.6–33)
MCHC RBC AUTO-ENTMCNC: 31.8 G/DL (ref 31.5–35.7)
MCV RBC AUTO: 84.4 FL (ref 79–97)
MONOCYTES # BLD AUTO: 0.53 10*3/MM3 (ref 0.1–0.9)
MONOCYTES NFR BLD AUTO: 6.3 % (ref 5–12)
NEUTROPHILS NFR BLD AUTO: 5.11 10*3/MM3 (ref 1.7–7)
NEUTROPHILS NFR BLD AUTO: 61.2 % (ref 42.7–76)
NITRITE UR QL STRIP: NEGATIVE
NRBC BLD AUTO-RTO: 0 /100 WBC (ref 0–0.2)
PH UR STRIP.AUTO: 6.5 [PH] (ref 5–8)
PLATELET # BLD AUTO: 364 10*3/MM3 (ref 140–450)
PMV BLD AUTO: 9.5 FL (ref 6–12)
POTASSIUM SERPL-SCNC: 3.4 MMOL/L (ref 3.5–5.2)
PROT SERPL-MCNC: 7.4 G/DL (ref 6–8.5)
PROT UR QL STRIP: NEGATIVE
PROTHROMBIN TIME: 12.8 SECONDS (ref 12.8–14.5)
RBC # BLD AUTO: 4.5 10*6/MM3 (ref 3.77–5.28)
RBC # UR: ABNORMAL /HPF
REF LAB TEST METHOD: ABNORMAL
SARS-COV-2 RNA PNL SPEC NAA+PROBE: NOT DETECTED
SODIUM SERPL-SCNC: 137 MMOL/L (ref 136–145)
SP GR UR STRIP: 1.02 (ref 1–1.03)
SQUAMOUS #/AREA URNS HPF: ABNORMAL /HPF
T VAGINALIS SPEC QL WET PREP: ABNORMAL
TROPONIN T SERPL-MCNC: <0.01 NG/ML (ref 0–0.03)
UROBILINOGEN UR QL STRIP: ABNORMAL
WBC # BLD AUTO: 8.36 10*3/MM3 (ref 3.4–10.8)
WBC SPEC QL WET PREP: ABNORMAL
WBC UR QL AUTO: ABNORMAL /HPF
WHOLE BLOOD HOLD SPECIMEN: NORMAL
WHOLE BLOOD HOLD SPECIMEN: NORMAL
YEAST GENITAL QL WET PREP: ABNORMAL

## 2021-11-13 PROCEDURE — C9803 HOPD COVID-19 SPEC COLLECT: HCPCS

## 2021-11-13 PROCEDURE — 85025 COMPLETE CBC W/AUTO DIFF WBC: CPT | Performed by: FAMILY MEDICINE

## 2021-11-13 PROCEDURE — 87220 TISSUE EXAM FOR FUNGI: CPT | Performed by: FAMILY MEDICINE

## 2021-11-13 PROCEDURE — 84702 CHORIONIC GONADOTROPIN TEST: CPT | Performed by: FAMILY MEDICINE

## 2021-11-13 PROCEDURE — 83735 ASSAY OF MAGNESIUM: CPT | Performed by: FAMILY MEDICINE

## 2021-11-13 PROCEDURE — 87636 SARSCOV2 & INF A&B AMP PRB: CPT | Performed by: FAMILY MEDICINE

## 2021-11-13 PROCEDURE — 83690 ASSAY OF LIPASE: CPT | Performed by: FAMILY MEDICINE

## 2021-11-13 PROCEDURE — 81001 URINALYSIS AUTO W/SCOPE: CPT | Performed by: FAMILY MEDICINE

## 2021-11-13 PROCEDURE — 85610 PROTHROMBIN TIME: CPT | Performed by: FAMILY MEDICINE

## 2021-11-13 PROCEDURE — 96372 THER/PROPH/DIAG INJ SC/IM: CPT

## 2021-11-13 PROCEDURE — 93005 ELECTROCARDIOGRAM TRACING: CPT | Performed by: FAMILY MEDICINE

## 2021-11-13 PROCEDURE — 87210 SMEAR WET MOUNT SALINE/INK: CPT | Performed by: FAMILY MEDICINE

## 2021-11-13 PROCEDURE — 99284 EMERGENCY DEPT VISIT MOD MDM: CPT

## 2021-11-13 PROCEDURE — 80053 COMPREHEN METABOLIC PANEL: CPT | Performed by: FAMILY MEDICINE

## 2021-11-13 PROCEDURE — 25010000002 METHOTREXATE SODIUM SOLUTION: Performed by: FAMILY MEDICINE

## 2021-11-13 PROCEDURE — 76817 TRANSVAGINAL US OBSTETRIC: CPT

## 2021-11-13 PROCEDURE — 93010 ELECTROCARDIOGRAM REPORT: CPT | Performed by: INTERNAL MEDICINE

## 2021-11-13 PROCEDURE — 84484 ASSAY OF TROPONIN QUANT: CPT | Performed by: FAMILY MEDICINE

## 2021-11-13 RX ORDER — POTASSIUM CHLORIDE 20 MEQ/1
40 TABLET, EXTENDED RELEASE ORAL ONCE
Status: COMPLETED | OUTPATIENT
Start: 2021-11-13 | End: 2021-11-13

## 2021-11-13 RX ORDER — METHOTREXATE SODIUM 25 MG/ML
50 INJECTION, SOLUTION INTRA-ARTERIAL; INTRAMUSCULAR; INTRAVENOUS ONCE
Status: DISCONTINUED | OUTPATIENT
Start: 2021-11-13 | End: 2021-11-13

## 2021-11-13 RX ORDER — METHOTREXATE SODIUM 25 MG/ML
50 INJECTION, SOLUTION INTRA-ARTERIAL; INTRAMUSCULAR; INTRAVENOUS ONCE
Status: COMPLETED | OUTPATIENT
Start: 2021-11-13 | End: 2021-11-13

## 2021-11-13 RX ORDER — KETOROLAC TROMETHAMINE 30 MG/ML
30 INJECTION, SOLUTION INTRAMUSCULAR; INTRAVENOUS ONCE
Status: DISCONTINUED | OUTPATIENT
Start: 2021-11-13 | End: 2021-11-13 | Stop reason: HOSPADM

## 2021-11-13 RX ORDER — SODIUM CHLORIDE 0.9 % (FLUSH) 0.9 %
10 SYRINGE (ML) INJECTION AS NEEDED
Status: DISCONTINUED | OUTPATIENT
Start: 2021-11-13 | End: 2021-11-13 | Stop reason: HOSPADM

## 2021-11-13 RX ADMIN — METHOTREXATE 100 MG: 25 INJECTION, SOLUTION INTRA-ARTERIAL; INTRAMUSCULAR; INTRAVENOUS at 12:36

## 2021-11-13 RX ADMIN — SODIUM CHLORIDE, POTASSIUM CHLORIDE, SODIUM LACTATE AND CALCIUM CHLORIDE 1000 ML: 600; 310; 30; 20 INJECTION, SOLUTION INTRAVENOUS at 09:03

## 2021-11-13 RX ADMIN — POTASSIUM CHLORIDE 40 MEQ: 20 TABLET, EXTENDED RELEASE ORAL at 12:32

## 2021-11-13 NOTE — ED NOTES
Patient advised to undress for pelvic exam as ordered by Dr. Banda.      Annie Martinez, RN  11/13/21 0955

## 2021-11-13 NOTE — CONSULTS
Consults    Patient Identification:  Name:  Urvashi Rinaldi  Age:  31 y.o.  Sex:  female  :  1990  MRN:  [id number]  Visit Number:  [id number]  Room number:  110/10  Primary care provider:  Provider, No Known   Physician requesting consultation: Anirudh Banda MD    Chief Complaint:    Chief Complaint   Patient presents with   • Pregnancy Problem     Lower abdominal pain.   Positive pregnancy test.     History of Presenting Illness:  I saw Ms Rinaldi at the emergency room at the request of Dr Banda.   Ms Rinaldi c/o lower abdominal pain onset a week ago. She has a positive pregnancy test and is thought to have ectopic pregnancy. LMP was end of September.   She had pain and vaginal bleeding a week ago. Bleeding and pain has subsided. She is being followed by Dr Salinas at [facility] with serial HCG quants. She was advised should come to the ER if abdominal pain returned. She had lower abdominal pain earlier this morning and decided to come to the ER because she is concerned about the ectopic pregnancy.   She denies severe abdominal pain today, bloating, dizziness or syncope.   HCG quant performed in the hospital ER on 2021 339 mIU/ml, repeat HCG today 1192 mIU/ml.  She had a pelvic ultrasound in the ER on 2021 and a repeat ultrasound today.   ---------------------------------------------------------------------------------------------------------------------  Review of Systems   Gastrointestinal: Positive for abdominal pain. Abdominal distention: abdominal discomfort.   All other systems reviewed and are negative.     ---------------------------------------------------------------------------------------------------------------------   Past History:  Family History   Problem Relation Age of Onset   • Hypertension Father    • Heart valve disorder Father    • Hypertension Paternal Grandmother      Past Medical History:   Diagnosis Date   • Hyperthyroidism    • Urinary tract  infection      Past Surgical History:   Procedure Laterality Date   • WISDOM TOOTH EXTRACTION  2014     Social History     Socioeconomic History   • Marital status:    Tobacco Use   • Smoking status: Never Smoker   • Smokeless tobacco: Never Used   Substance and Sexual Activity   • Alcohol use: Not Currently   • Drug use: Never     ---------------------------------------------------------------------------------------------------------------------   Allergies:  Patient has no known allergies.  ---------------------------------------------------------------------------------------------------------------------    Prior to Admission Medications     Prescriptions Last Dose Informant Patient Reported? Taking?    ferrous sulfate 325 (65 FE) MG tablet  Self Yes No    Take 325 mg by mouth Daily.    hydroCHLOROthiazide (MICROZIDE) 12.5 MG capsule   No No    Take 1 capsule by mouth Every Morning.    ibuprofen (ADVIL,MOTRIN) 800 MG tablet  Self No No    Take 1 tablet by mouth Every 6 (Six) Hours As Needed for Mild Pain .    labetalol (NORMODYNE) 300 MG tablet   No No    Take 1 tablet by mouth 3 (Three) Times a Day.        Hospital Meds:  ketorolac, 30 mg, Intramuscular, Once         ---------------------------------------------------------------------------------------------------------------------   Objective     Vital Signs:  Temp:  [98 °F (36.7 °C)-98.4 °F (36.9 °C)] 98.4 °F (36.9 °C)  Heart Rate:  [] 96  Resp:  [18-20] 18  BP: (122-137)/(83-88) 122/84    Body mass index is 31.96 kg/m².    Wt Readings from Last 1 Encounters:   11/13/21 0840 89.8 kg (198 lb)     Wt Readings from Last 3 Encounters:   11/13/21 89.8 kg (198 lb)   11/06/21 89.8 kg (198 lb)   03/02/20 102 kg (225 lb)             Constitutional:  Well-developed and well-nourished.  No respiratory distress.      HENT:  Head:  Normocephalic and atraumatic.  Mouth:  Moist mucous membranes.    Eyes:  Conjunctivae and EOM are normal.  No scleral icterus.     Neck:  Neck supple.  No JVD present.    Cardiovascular:  Normal rate, regular rhythm and normal heart sounds with no murmur. No edema.  Pulmonary/Chest:  No respiratory distress, no wheezes, no crackles, with normal breath sounds and good air movement.  Abdominal:  Soft.  Bowel sounds are normal.  No distension and no tenderness.  Musculoskeletal:  No edema, no tenderness, and no deformity.  No red or swollen joints anywhere.    Neurological:  Alert and oriented to person, place, and time. No facial droop.  No slurred speech.   Skin:  Skin is warm and dry. No rash noted. No pallor.   Pelvic Exam: deferred.   ---------------------------------------------------------------------------------------------------------------------     ---------------------------------------------------------------------------------------------------------------------     .    LABS:    CBC and coagulation:  Results from last 7 days   Lab Units 11/13/21  0900 11/06/21 2006   WBC 10*3/mm3 8.36 8.69   HEMOGLOBIN g/dL 12.1 12.7   HEMATOCRIT % 38.0 40.6   MCV fL 84.4 84.9   MCHC g/dL 31.8 31.3*   PLATELETS 10*3/mm3 364 333   INR  0.92  --        Renal and electrolytes:  Results from last 7 days   Lab Units 11/13/21  0900 11/06/21 2007   SODIUM mmol/L 137 139   POTASSIUM mmol/L 3.4* 4.3   MAGNESIUM mg/dL 2.0  --    CHLORIDE mmol/L 105 104   CO2 mmol/L 18.5* 20.3*   BUN mg/dL 8 10   CREATININE mg/dL 0.57 0.75   EGFR IF NONAFRICN AM mL/min/1.73 125 91   CALCIUM mg/dL 8.8 9.3   GLUCOSE mg/dL 107* 115*     Estimated Creatinine Clearance: 164.4 mL/min (by C-G formula based on SCr of 0.57 mg/dL).    Liver and pancreatic function:  Results from last 7 days   Lab Units 11/13/21  0900 11/06/21 2007   ALBUMIN g/dL 4.50 4.78   BILIRUBIN mg/dL 0.4 0.2   ALK PHOS U/L 85 92   AST (SGOT) U/L 18 24   ALT (SGPT) U/L 33 30   LIPASE U/L 29  --      Endocrine function:  No results found for: HGBA1C  Point of care bedside glucose levels:      No results found  for: TSH, FREET4  Cardiac:  Results from last 7 days   Lab Units 11/13/21 0900   TROPONIN T ng/mL <0.010       Cultures:  Lab Results   Component Value Date    COLORU Yellow 11/13/2021    CLARITYU Cloudy (A) 11/13/2021    PHUR 6.5 11/13/2021    GLUCOSEU Negative 11/13/2021    KETONESU Negative 11/13/2021    BLOODU Small (1+) (A) 11/13/2021    NITRITEU Negative 11/13/2021    LEUKOCYTESUR Trace (A) 11/13/2021    BILIRUBINUR Negative 11/13/2021    UROBILINOGEN 1.0 E.U./dL 11/13/2021    RBCUA 0-2 11/13/2021    WBCUA 0-2 11/13/2021    BACTERIA None Seen 11/13/2021     Microbiology Results (last 10 days)     Procedure Component Value - Date/Time    NIKIA Prep - Swab, Vagina [[id number]] Collected: 11/13/21 0918    Lab Status: Final result Specimen: Swab from Vagina Updated: 11/13/21 0951     KOH Prep No yeast or hyphal elements seen    Wet Prep, Genital - Swab, Vagina [[id number]]  (Abnormal) Collected: 11/13/21 0918    Lab Status: Final result Specimen: Swab from Vagina Updated: 11/13/21 0951     YEAST No yeast seen     HYPHAL ELEMENTS No Hyphal elements seen     WBC'S 2+ WBC's seen     Trichomonas, Wet Prep No Trichomonas seen    COVID-19 and FLU A/B PCR - Swab, Nasopharynx [[id number]]  (Normal) Collected: 11/13/21 0902    Lab Status: Final result Specimen: Swab from Nasopharynx Updated: 11/13/21 0945     COVID19 Not Detected     Influenza A PCR Not Detected     Influenza B PCR Not Detected    Narrative:      Fact sheet for providers: https://www.fda.gov/media/805092/download    Fact sheet for patients: https://www.fda.gov/media/817081/download    Test performed by PCR.          Lab Results   Component Value Date    HCGQUANT 1,192.00 11/13/2021     Pain Management Panel    There is no flowsheet data to display.         I have personally looked at the labs and they are summarized above.  ---------------------------------------------------------------------------------------------------------------------   Detailed radiology  reports for the last 24 hours:    Imaging Results (Last 24 Hours)     Procedure Component Value Units Date/Time    US Ob Transvaginal [[id number]] Collected: 11/13/21 1032     Updated: 11/13/21 1034    Narrative:      ULTRASOUND PELVIS WITH DOPPLER (COMPLETE), 11/13/2021    INDICATION:  31-year-old female with early 1st trimester pregnancy and ongoing vaginal bleeding. She was assessed in the ED here one week ago with ultrasound showing endometrial fluid and endometrial thickening, but no normal gestational sac within the uterus. She  was assessed in her obstetrician's office earlier this week and she reports there was concern for left adnexal ectopic on office ultrasound (no details available). She presents to the ED today with ongoing vaginal bleeding. Of note, quantitative serum  beta-hCG level is rising, measuring 339 mIU/mL 7 days ago and currently measuring 1,192 mIU/mL.    TECHNIQUE:  Pelvic ultrasound examination was performed endovaginally.    FINDINGS:  The uterus is normal in size. Anechoic fluid previously present within the endometrial cavity is no longer seen. However, there is a small amount of low level echogenic material within the endometrial cavity which may represent products of hemorrhage.  Endometrial thickening has decreased since the prior study.    Both ovaries are identified. There is a small 1.9 cm cystic structure adjacent to the left ovary having a crenulated wall and surrounding Doppler hyperemia. Diagnostic possibilities include corpus luteum of pregnancy as well as adnexal ectopic gestation.    Both ovaries are otherwise unremarkable. There is no significant free pelvic fluid. Doppler evaluation documents bilateral ovary blood flow including arterial inflow and venous outflow.    *  Uterus: 6.4 x 5.2 x 5.9 cm.  *  Endometrial thickness: 1.2 cm.  *  Right ovary: 2.2 x 1.3 x 2.9 cm.  *  Left ovary: 3.3 x 2.0 x 3.2 cm.      Impression:      1.  Small irregular cystic structure adjacent to  the left ovary with surrounding color Doppler hyperemia with empty endometrial cavity and rising beta-hCG level. While this may represent corpus luteum cyst, adnexal ectopic is not excluded in the given  clinical presentation.  2.  Moderate volume anechoic fluid previously seen within the endometrial cavity one week ago is no longer present. Probable small quantity products of hemorrhage within the endometrial cavity. Decreasing endometrial thickness.  3.  There is no free pelvic fluid.  4.  Both ovaries otherwise negative with normal Doppler blood flow.    Signer Name: Quoc Fontenot MD   Signed: 11/13/2021 10:32 AM   Workstation Name: [id number]    Radiology [facility]        Final impressions for the last 30 days of radiology reports:    US Ob Transvaginal    Result Date: 11/13/2021  1.  Small irregular cystic structure adjacent to the left ovary with surrounding color Doppler hyperemia with empty endometrial cavity and rising beta-hCG level. While this may represent corpus luteum cyst, adnexal ectopic is not excluded in the given clinical presentation. 2.  Moderate volume anechoic fluid previously seen within the endometrial cavity one week ago is no longer present. Probable small quantity products of hemorrhage within the endometrial cavity. Decreasing endometrial thickness. 3.  There is no free pelvic fluid. 4.  Both ovaries otherwise negative with normal Doppler blood flow. Signer Name: Quoc Fontenot MD  Signed: 11/13/2021 10:32 AM  Workstation [facility]: [facility]  Radiology [facility]    US Ob Transvaginal    Result Date: 11/6/2021  Gestational sac is visualized for an estimated gestational age of 4 weeks and 6 days. A yolk sac is not visualized. Gestational sac has an abnormal morphology and the endometrium is significantly thickened with hyperechoic material/blood product. The findings are concerning for potential miscarriage. This was discussed with Dr. Banda at  the time of dictation. Signer Name: Karina Latif MD  Signed: 11/6/2021 10:05 PM  Workstation Name: [id number]  [facility]    I have personally looked at the radiology images and read the final radiology report.  ----------------------------------------------------------------------------------------------------------------------  Assessment & Plan      Assessment and Plan:    I reviewed her laboratory test results and ultrasound images and report from 11/6 and 11/13/21. I have seen and examined MS Rinaldi, she denies abdominal or pelvic pain today and there is no abdominally tenderness. There is no feature suggestive of rupture.  I counseled her on the diagnosis: Ectopic pregnancy, NOT ruptured. I discussed options for treatment and recommended medical treatment with Methotrexate. She asked questions and I answered them accordingly. After counseling, she agreed with the plan of care and gave informed consent for treatment of none ruptured ectopic pregnancy with Methotrexate. I recommended she should hold the prenatal vitamins for the next 3 weeks, avoid sexual intercourse and any strenuous exercise for 3 weeks.   I recommended she should follow up at [facility] for HCG quant on Tuesday, 11/16/2021 and another HCG quant in the hospital on 11/19/2021. She needs to see a physician at [facility] on Friday to review the HCG quant on 11/19/2021. She expressed understanding and has agreed with the plan of care.   Thank you for the consult.      James Leslie MD  [facility]  11/13/21  13:56 EST

## 2021-11-13 NOTE — ED NOTES
Chaperoned pelvic exam performed by Dr. Banda, patient tolerated well. Swabs sent to lab per order.      Annie Martinez RN  11/13/21 [id number]

## 2021-11-13 NOTE — ED NOTES
Dr. Banda stated to hold po potassium for now until further notice. Awaiting patient to return from ultrasound.      Annie Martinez RN  11/13/21 4538

## 2021-11-13 NOTE — ED PROVIDER NOTES
"Subjective   31-year-old  001 presents the emergency room with complaints of vaginal bleeding.  Patient states that she is currently 6 weeks pregnant.  She reports hot  she came to the emergency room for vaginal bleeding at that time was diagnosed with threatened miscarriage.  She states that she followed up with Dr. Salinas on Friday was concern for possible ectopic pregnancy.  She states that she was instructed to come to the emergency room she started left shoulder pain and reports that this can be a sign of an ectopic rupture.  She states that she is had some diarrhea as well.  She states that she was told that this could be a sign of ectopic as well.  She denies being on antibiotics in last 3 months.  She reports that she is scheduled to follow-up with Dr. Salinas on Monday for possible \"shot\".  She still reports she had some vaginal bleeding but has had improvement of vaginal bleeding.  Patient does report shoulder discomfort is made worse with movement as well as with laying flat.  She denies chest pain or shortness of breath.  She does report a history in her first pregnancy having preeclampsia.  She is not on any medications.      Vaginal Bleeding  Quality:  Bright red  Severity:  Mild  Timing:  Constant  Progression:  Improving  Chronicity:  New  Possible pregnancy: yes    Relieved by:  Nothing  Worsened by:  Nothing  Associated symptoms: abdominal pain    Associated symptoms: no back pain, no dizziness, no dysuria, no fatigue, no fever and no nausea        Review of Systems   Constitutional: Negative for fatigue and fever.   Gastrointestinal: Positive for abdominal pain. Negative for nausea.   Genitourinary: Positive for vaginal bleeding. Negative for dysuria.   Musculoskeletal: Negative for back pain.   Neurological: Negative for dizziness.   All other systems reviewed and are negative.      Past Medical History:   Diagnosis Date   • Hyperthyroidism    • Urinary tract infection        No " Known Allergies    Past Surgical History:   Procedure Laterality Date   • WISDOM TOOTH EXTRACTION  2014       Family History   Problem Relation Age of Onset   • Hypertension Father    • Heart valve disorder Father    • Hypertension Paternal Grandmother        Social History     Socioeconomic History   • Marital status:    Tobacco Use   • Smoking status: Never Smoker   • Smokeless tobacco: Never Used   Substance and Sexual Activity   • Alcohol use: Not Currently   • Drug use: Never           Objective   Physical Exam  Vitals and nursing note reviewed.   Constitutional:       Appearance: She is obese. She is not ill-appearing or diaphoretic.      Comments: No distress.   HENT:      Head: Normocephalic and atraumatic.      Mouth/Throat:      Mouth: Mucous membranes are moist.   Eyes:      General: No scleral icterus.     Conjunctiva/sclera: Conjunctivae normal.      Pupils: Pupils are equal, round, and reactive to light.   Cardiovascular:      Rate and Rhythm: Tachycardia present.      Pulses: Normal pulses.      Heart sounds: No murmur heard.      Pulmonary:      Effort: Pulmonary effort is normal.      Breath sounds: Normal breath sounds.      Comments: Unlabored breathing.  Abdominal:      Palpations: Abdomen is soft.      Comments: Suprapubic tenderness.  No guarding or rigidity.  Generalized abdominal striae.   Musculoskeletal:      Cervical back: Neck supple.      Comments: Left shoulder tenderness with flexion past 90 degrees.  No swelling appreciated.   Skin:     General: Skin is warm and dry.      Capillary Refill: Capillary refill takes less than 2 seconds.   Neurological:      General: No focal deficit present.      Mental Status: She is alert and oriented to person, place, and time.      Cranial Nerves: No cranial nerve deficit.      Comments: Symmetric sensation light touch.   Psychiatric:         Mood and Affect: Mood normal.         Procedures           ED Course  ED Course as of 11/13/21 4624    Sat Nov 13, 2021   0908 EKG sinus tachycardia ventricular 121  QRS 80  normal axis no ST elevation. [BB]   0916 Pelvic examination performed with RN chaperone.  Patient cervix is closed.  No lesions appreciated.  Scant amount of thin white discharge.  No bleeding appreciated. [BB]   0924 Have reviewed US findings from 11/6/21 that revealed below:  Gestational sac is visualized for an estimated gestational age of 4 weeks and 6 days. A yolk sac is not visualized. Gestational sac has an abnormal morphology and the endometrium is significantly thickened with hyperechoic material/blood product. The  findings are concerning for potential miscarriage [BB]   0929 CBC is unremarkable [BB]   0944 Patient's coags are unremarkable [BB]   0945 Patient Covid flu negative [BB]   0946 Troponin unremarkable [BB]   0952 CMP glucose 107 potassium slightly low at 3.4 have ordered oral replacement lipase magnesium unremarkable [BB]   0958 hCG 1192 [BB]   1040 Have reviewed results of ultrasound.  Have contacted Dr. Leslie in light of patient's ultrasound results.  He states he will review images and contact back. [BB]   1102 Dr. Leslie states he will come to ER and evaluate patient as he is concerned for ectopic pregnancy [BB]   1137 Dr. Leslie has presented and evaluated patient. He desires patient to have methotrexate 50 mg/m² have contacted pharmacy per request of Dr. Leslie and notified of dosing desired. Patient received a dose of methotrexate in the emergency room here and then to follow-up in their office with OB/GYN this week. Informed her warning signs and symptoms or return emergency room patient verbalized understanding. [BB]   1250 Patient received methotrexate injection as requested by Dr. Leslie [BB]   1250 Patient has tolerated procedure tolerate medication.  Patient hemodynamic stable.  Patient to follow-up with Dr. Salinas on Tuesday.  Again discussed warning signs and symptoms that warrant return emergency  room patient verbalized understanding [BB]      ED Course User Index  [BB] Anirudh Banda MD                                           [facility]    Final diagnoses:   Ectopic pregnancy without intrauterine pregnancy, unspecified location       ED Disposition  ED Disposition     ED Disposition Condition Comment    Discharge Stable           Maynor Salinas MD  [address] [facility] [address] [address]  [facility] [address]  [phone]      Go to office on Tuesday         Medication List      No changes were made to your prescriptions during this visit.          Anirudh Banda MD  11/13/21 4655

## 2021-11-16 ENCOUNTER — ANESTHESIA (OUTPATIENT)
Dept: PERIOP | Facility: HOSPITAL | Age: 31
End: 2021-11-16

## 2021-11-16 ENCOUNTER — APPOINTMENT (OUTPATIENT)
Dept: GENERAL RADIOLOGY | Facility: HOSPITAL | Age: 31
End: 2021-11-16

## 2021-11-16 ENCOUNTER — APPOINTMENT (OUTPATIENT)
Dept: ULTRASOUND IMAGING | Facility: HOSPITAL | Age: 31
End: 2021-11-16

## 2021-11-16 ENCOUNTER — HOSPITAL ENCOUNTER (OUTPATIENT)
Facility: HOSPITAL | Age: 31
Discharge: HOME | End: 2021-11-16
Attending: STUDENT IN AN ORGANIZED HEALTH CARE EDUCATION/TRAINING PROGRAM | Admitting: OBSTETRICS & GYNECOLOGY

## 2021-11-16 ENCOUNTER — ANESTHESIA EVENT (OUTPATIENT)
Dept: PERIOP | Facility: HOSPITAL | Age: 31
End: 2021-11-16

## 2021-11-16 VITALS
DIASTOLIC BLOOD PRESSURE: 59 MMHG | HEIGHT: 66 IN | TEMPERATURE: 99.5 F | RESPIRATION RATE: 18 BRPM | SYSTOLIC BLOOD PRESSURE: 100 MMHG | OXYGEN SATURATION: 98 % | HEART RATE: 111 BPM | WEIGHT: 197 LBS | BODY MASS INDEX: 31.66 KG/M2

## 2021-11-16 DIAGNOSIS — O00.102 LEFT TUBAL PREGNANCY WITHOUT INTRAUTERINE PREGNANCY: Primary | ICD-10-CM

## 2021-11-16 LAB
ABO GROUP BLD: NORMAL
ANION GAP SERPL CALCULATED.3IONS-SCNC: 13 MMOL/L (ref 5–15)
APTT PPP: 30.5 SECONDS (ref 25.5–35.4)
BACTERIA UR QL AUTO: ABNORMAL /HPF
BASOPHILS # BLD AUTO: 0.04 10*3/MM3 (ref 0–0.2)
BASOPHILS NFR BLD AUTO: 0.5 % (ref 0–1.5)
BILIRUB UR QL STRIP: NEGATIVE
BLD GP AB SCN SERPL QL: NEGATIVE
BUN SERPL-MCNC: 13 MG/DL (ref 6–20)
BUN/CREAT SERPL: 19.7 (ref 7–25)
CALCIUM SPEC-SCNC: 8.8 MG/DL (ref 8.6–10.5)
CHLORIDE SERPL-SCNC: 105 MMOL/L (ref 98–107)
CLARITY UR: ABNORMAL
CO2 SERPL-SCNC: 20 MMOL/L (ref 22–29)
COLOR UR: YELLOW
CREAT SERPL-MCNC: 0.66 MG/DL (ref 0.57–1)
CRP SERPL-MCNC: 1.45 MG/DL (ref 0–0.5)
DEPRECATED RDW RBC AUTO: 44.9 FL (ref 37–54)
EOSINOPHIL # BLD AUTO: 0.11 10*3/MM3 (ref 0–0.4)
EOSINOPHIL NFR BLD AUTO: 1.2 % (ref 0.3–6.2)
ERYTHROCYTE [DISTWIDTH] IN BLOOD BY AUTOMATED COUNT: 14.6 % (ref 12.3–15.4)
FLUAV SUBTYP SPEC NAA+PROBE: NOT DETECTED
FLUBV RNA ISLT QL NAA+PROBE: NOT DETECTED
GFR SERPL CREATININE-BSD FRML MDRD: 106 ML/MIN/1.73
GLUCOSE SERPL-MCNC: 145 MG/DL (ref 65–99)
GLUCOSE UR STRIP-MCNC: NEGATIVE MG/DL
HCG INTACT+B SERPL-ACNC: 1133 MIU/ML
HCT VFR BLD AUTO: 38.3 % (ref 34–46.6)
HGB BLD-MCNC: 12.1 G/DL (ref 12–15.9)
HGB UR QL STRIP.AUTO: ABNORMAL
HYALINE CASTS UR QL AUTO: ABNORMAL /LPF
IMM GRANULOCYTES # BLD AUTO: 0.05 10*3/MM3 (ref 0–0.05)
IMM GRANULOCYTES NFR BLD AUTO: 0.6 % (ref 0–0.5)
INR PPP: 0.94 (ref 0.9–1.1)
KETONES UR QL STRIP: NEGATIVE
LEUKOCYTE ESTERASE UR QL STRIP.AUTO: ABNORMAL
LYMPHOCYTES # BLD AUTO: 2.42 10*3/MM3 (ref 0.7–3.1)
LYMPHOCYTES NFR BLD AUTO: 27.3 % (ref 19.6–45.3)
MCH RBC QN AUTO: 26.8 PG (ref 26.6–33)
MCHC RBC AUTO-ENTMCNC: 31.6 G/DL (ref 31.5–35.7)
MCV RBC AUTO: 84.9 FL (ref 79–97)
MONOCYTES # BLD AUTO: 0.43 10*3/MM3 (ref 0.1–0.9)
MONOCYTES NFR BLD AUTO: 4.9 % (ref 5–12)
NEUTROPHILS NFR BLD AUTO: 5.81 10*3/MM3 (ref 1.7–7)
NEUTROPHILS NFR BLD AUTO: 65.5 % (ref 42.7–76)
NITRITE UR QL STRIP: NEGATIVE
NRBC BLD AUTO-RTO: 0 /100 WBC (ref 0–0.2)
PH UR STRIP.AUTO: 6.5 [PH] (ref 5–8)
PLATELET # BLD AUTO: 347 10*3/MM3 (ref 140–450)
PMV BLD AUTO: 9.4 FL (ref 6–12)
POTASSIUM SERPL-SCNC: 3.7 MMOL/L (ref 3.5–5.2)
PROT UR QL STRIP: NEGATIVE
PROTHROMBIN TIME: 13 SECONDS (ref 12.8–14.5)
QT INTERVAL: 332 MS
QTC INTERVAL: 471 MS
RBC # BLD AUTO: 4.51 10*6/MM3 (ref 3.77–5.28)
RBC # UR: ABNORMAL /HPF
REF LAB TEST METHOD: ABNORMAL
RH BLD: POSITIVE
SARS-COV-2 RNA PNL SPEC NAA+PROBE: NOT DETECTED
SODIUM SERPL-SCNC: 138 MMOL/L (ref 136–145)
SP GR UR STRIP: 1.03 (ref 1–1.03)
SQUAMOUS #/AREA URNS HPF: ABNORMAL /HPF
T&S EXPIRATION DATE: NORMAL
UROBILINOGEN UR QL STRIP: ABNORMAL
WBC # BLD AUTO: 8.86 10*3/MM3 (ref 3.4–10.8)
WBC UR QL AUTO: ABNORMAL /HPF

## 2021-11-16 PROCEDURE — 96375 TX/PRO/DX INJ NEW DRUG ADDON: CPT

## 2021-11-16 PROCEDURE — 85610 PROTHROMBIN TIME: CPT | Performed by: STUDENT IN AN ORGANIZED HEALTH CARE EDUCATION/TRAINING PROGRAM

## 2021-11-16 PROCEDURE — 76817 TRANSVAGINAL US OBSTETRIC: CPT

## 2021-11-16 PROCEDURE — 25010000002 CEFOXITIN PER 1 G: Performed by: NURSE ANESTHETIST, CERTIFIED REGISTERED

## 2021-11-16 PROCEDURE — 25010000002 KETOROLAC TROMETHAMINE PER 15 MG: Performed by: NURSE ANESTHETIST, CERTIFIED REGISTERED

## 2021-11-16 PROCEDURE — 25010000002 FENTANYL CITRATE (PF) 50 MCG/ML SOLUTION: Performed by: NURSE ANESTHETIST, CERTIFIED REGISTERED

## 2021-11-16 PROCEDURE — 25010000002 NEOSTIGMINE 10 MG/10ML SOLUTION: Performed by: NURSE ANESTHETIST, CERTIFIED REGISTERED

## 2021-11-16 PROCEDURE — 87636 SARSCOV2 & INF A&B AMP PRB: CPT | Performed by: STUDENT IN AN ORGANIZED HEALTH CARE EDUCATION/TRAINING PROGRAM

## 2021-11-16 PROCEDURE — 86850 RBC ANTIBODY SCREEN: CPT | Performed by: STUDENT IN AN ORGANIZED HEALTH CARE EDUCATION/TRAINING PROGRAM

## 2021-11-16 PROCEDURE — 25010000002 MORPHINE PER 10 MG: Performed by: EMERGENCY MEDICINE

## 2021-11-16 PROCEDURE — 25010000002 ONDANSETRON PER 1 MG: Performed by: EMERGENCY MEDICINE

## 2021-11-16 PROCEDURE — 99284 EMERGENCY DEPT VISIT MOD MDM: CPT

## 2021-11-16 PROCEDURE — 25010000002 METOCLOPRAMIDE PER 10 MG: Performed by: OBSTETRICS & GYNECOLOGY

## 2021-11-16 PROCEDURE — 86140 C-REACTIVE PROTEIN: CPT | Performed by: STUDENT IN AN ORGANIZED HEALTH CARE EDUCATION/TRAINING PROGRAM

## 2021-11-16 PROCEDURE — 25010000002 MORPHINE PER 10 MG: Performed by: OBSTETRICS & GYNECOLOGY

## 2021-11-16 PROCEDURE — G0378 HOSPITAL OBSERVATION PER HR: HCPCS

## 2021-11-16 PROCEDURE — 25010000002 ONDANSETRON PER 1 MG: Performed by: NURSE ANESTHETIST, CERTIFIED REGISTERED

## 2021-11-16 PROCEDURE — 86901 BLOOD TYPING SEROLOGIC RH(D): CPT | Performed by: STUDENT IN AN ORGANIZED HEALTH CARE EDUCATION/TRAINING PROGRAM

## 2021-11-16 PROCEDURE — 96376 TX/PRO/DX INJ SAME DRUG ADON: CPT

## 2021-11-16 PROCEDURE — 85025 COMPLETE CBC W/AUTO DIFF WBC: CPT | Performed by: STUDENT IN AN ORGANIZED HEALTH CARE EDUCATION/TRAINING PROGRAM

## 2021-11-16 PROCEDURE — 25010000002 MIDAZOLAM PER 1 MG: Performed by: NURSE ANESTHETIST, CERTIFIED REGISTERED

## 2021-11-16 PROCEDURE — 25010000002 PROPOFOL 10 MG/ML EMULSION: Performed by: NURSE ANESTHETIST, CERTIFIED REGISTERED

## 2021-11-16 PROCEDURE — 81001 URINALYSIS AUTO W/SCOPE: CPT | Performed by: STUDENT IN AN ORGANIZED HEALTH CARE EDUCATION/TRAINING PROGRAM

## 2021-11-16 PROCEDURE — 86900 BLOOD TYPING SEROLOGIC ABO: CPT | Performed by: STUDENT IN AN ORGANIZED HEALTH CARE EDUCATION/TRAINING PROGRAM

## 2021-11-16 PROCEDURE — 84702 CHORIONIC GONADOTROPIN TEST: CPT | Performed by: STUDENT IN AN ORGANIZED HEALTH CARE EDUCATION/TRAINING PROGRAM

## 2021-11-16 PROCEDURE — 86923 COMPATIBILITY TEST ELECTRIC: CPT

## 2021-11-16 PROCEDURE — C9803 HOPD COVID-19 SPEC COLLECT: HCPCS

## 2021-11-16 PROCEDURE — 25010000002 DEXAMETHASONE PER 1 MG: Performed by: NURSE ANESTHETIST, CERTIFIED REGISTERED

## 2021-11-16 PROCEDURE — 80048 BASIC METABOLIC PNL TOTAL CA: CPT | Performed by: STUDENT IN AN ORGANIZED HEALTH CARE EDUCATION/TRAINING PROGRAM

## 2021-11-16 PROCEDURE — 96374 THER/PROPH/DIAG INJ IV PUSH: CPT

## 2021-11-16 PROCEDURE — 85730 THROMBOPLASTIN TIME PARTIAL: CPT | Performed by: STUDENT IN AN ORGANIZED HEALTH CARE EDUCATION/TRAINING PROGRAM

## 2021-11-16 RX ORDER — LIDOCAINE HYDROCHLORIDE 20 MG/ML
INJECTION, SOLUTION INFILTRATION; PERINEURAL AS NEEDED
Status: DISCONTINUED | OUTPATIENT
Start: 2021-11-16 | End: 2021-11-16 | Stop reason: SURG

## 2021-11-16 RX ORDER — BUPIVACAINE HYDROCHLORIDE 5 MG/ML
INJECTION, SOLUTION PERINEURAL AS NEEDED
Status: DISCONTINUED | OUTPATIENT
Start: 2021-11-16 | End: 2021-11-16 | Stop reason: HOSPADM

## 2021-11-16 RX ORDER — DROPERIDOL 2.5 MG/ML
0.62 INJECTION, SOLUTION INTRAMUSCULAR; INTRAVENOUS ONCE AS NEEDED
Status: DISCONTINUED | OUTPATIENT
Start: 2021-11-16 | End: 2021-11-16 | Stop reason: HOSPADM

## 2021-11-16 RX ORDER — ONDANSETRON 2 MG/ML
INJECTION INTRAMUSCULAR; INTRAVENOUS AS NEEDED
Status: DISCONTINUED | OUTPATIENT
Start: 2021-11-16 | End: 2021-11-16 | Stop reason: SURG

## 2021-11-16 RX ORDER — NEOSTIGMINE METHYLSULFATE 1 MG/ML
INJECTION, SOLUTION INTRAVENOUS AS NEEDED
Status: DISCONTINUED | OUTPATIENT
Start: 2021-11-16 | End: 2021-11-16 | Stop reason: SURG

## 2021-11-16 RX ORDER — ONDANSETRON 2 MG/ML
4 INJECTION INTRAMUSCULAR; INTRAVENOUS AS NEEDED
Status: DISCONTINUED | OUTPATIENT
Start: 2021-11-16 | End: 2021-11-16 | Stop reason: HOSPADM

## 2021-11-16 RX ORDER — MORPHINE SULFATE 2 MG/ML
2 INJECTION, SOLUTION INTRAMUSCULAR; INTRAVENOUS
Status: DISCONTINUED | OUTPATIENT
Start: 2021-11-16 | End: 2021-11-16 | Stop reason: HOSPADM

## 2021-11-16 RX ORDER — SODIUM CHLORIDE, SODIUM LACTATE, POTASSIUM CHLORIDE, CALCIUM CHLORIDE 600; 310; 30; 20 MG/100ML; MG/100ML; MG/100ML; MG/100ML
INJECTION, SOLUTION INTRAVENOUS CONTINUOUS PRN
Status: DISCONTINUED | OUTPATIENT
Start: 2021-11-16 | End: 2021-11-16 | Stop reason: SURG

## 2021-11-16 RX ORDER — GLYCOPYRROLATE 0.2 MG/ML
INJECTION INTRAMUSCULAR; INTRAVENOUS AS NEEDED
Status: DISCONTINUED | OUTPATIENT
Start: 2021-11-16 | End: 2021-11-16 | Stop reason: SURG

## 2021-11-16 RX ORDER — MORPHINE SULFATE 2 MG/ML
2 INJECTION, SOLUTION INTRAMUSCULAR; INTRAVENOUS ONCE
Status: COMPLETED | OUTPATIENT
Start: 2021-11-16 | End: 2021-11-16

## 2021-11-16 RX ORDER — HYDROCODONE BITARTRATE AND ACETAMINOPHEN 5; 325 MG/1; MG/1
1 TABLET ORAL EVERY 4 HOURS PRN
Qty: 15 TABLET | Refills: 0 | Status: SHIPPED | OUTPATIENT
Start: 2021-11-16 | End: 2022-02-15

## 2021-11-16 RX ORDER — SODIUM CHLORIDE, SODIUM LACTATE, POTASSIUM CHLORIDE, CALCIUM CHLORIDE 600; 310; 30; 20 MG/100ML; MG/100ML; MG/100ML; MG/100ML
125 INJECTION, SOLUTION INTRAVENOUS ONCE
Status: COMPLETED | OUTPATIENT
Start: 2021-11-16 | End: 2021-11-16

## 2021-11-16 RX ORDER — CEFOXITIN 2 G/1
2 INJECTION, POWDER, FOR SOLUTION INTRAVENOUS ONCE
Status: DISCONTINUED | OUTPATIENT
Start: 2021-11-16 | End: 2021-11-16 | Stop reason: HOSPADM

## 2021-11-16 RX ORDER — BUPIVACAINE HYDROCHLORIDE AND EPINEPHRINE 5; 5 MG/ML; UG/ML
INJECTION, SOLUTION PERINEURAL AS NEEDED
Status: DISCONTINUED | OUTPATIENT
Start: 2021-11-16 | End: 2021-11-16 | Stop reason: HOSPADM

## 2021-11-16 RX ORDER — FAMOTIDINE 10 MG/ML
INJECTION, SOLUTION INTRAVENOUS AS NEEDED
Status: DISCONTINUED | OUTPATIENT
Start: 2021-11-16 | End: 2021-11-16 | Stop reason: SURG

## 2021-11-16 RX ORDER — ONDANSETRON 2 MG/ML
4 INJECTION INTRAMUSCULAR; INTRAVENOUS ONCE
Status: COMPLETED | OUTPATIENT
Start: 2021-11-16 | End: 2021-11-16

## 2021-11-16 RX ORDER — DEXAMETHASONE SODIUM PHOSPHATE 4 MG/ML
INJECTION, SOLUTION INTRA-ARTICULAR; INTRALESIONAL; INTRAMUSCULAR; INTRAVENOUS; SOFT TISSUE AS NEEDED
Status: DISCONTINUED | OUTPATIENT
Start: 2021-11-16 | End: 2021-11-16 | Stop reason: SURG

## 2021-11-16 RX ORDER — OXYCODONE HYDROCHLORIDE AND ACETAMINOPHEN 5; 325 MG/1; MG/1
1 TABLET ORAL ONCE AS NEEDED
Status: DISCONTINUED | OUTPATIENT
Start: 2021-11-16 | End: 2021-11-16 | Stop reason: HOSPADM

## 2021-11-16 RX ORDER — METOCLOPRAMIDE HYDROCHLORIDE 5 MG/ML
10 INJECTION INTRAMUSCULAR; INTRAVENOUS EVERY 6 HOURS PRN
Status: DISCONTINUED | OUTPATIENT
Start: 2021-11-16 | End: 2021-11-16 | Stop reason: HOSPADM

## 2021-11-16 RX ORDER — MAGNESIUM HYDROXIDE 1200 MG/15ML
LIQUID ORAL AS NEEDED
Status: DISCONTINUED | OUTPATIENT
Start: 2021-11-16 | End: 2021-11-16 | Stop reason: HOSPADM

## 2021-11-16 RX ORDER — PROPOFOL 10 MG/ML
VIAL (ML) INTRAVENOUS AS NEEDED
Status: DISCONTINUED | OUTPATIENT
Start: 2021-11-16 | End: 2021-11-16 | Stop reason: SURG

## 2021-11-16 RX ORDER — CEFOXITIN 2 G/1
INJECTION, POWDER, FOR SOLUTION INTRAVENOUS AS NEEDED
Status: DISCONTINUED | OUTPATIENT
Start: 2021-11-16 | End: 2021-11-16 | Stop reason: SURG

## 2021-11-16 RX ORDER — HYDROMORPHONE HYDROCHLORIDE 1 MG/ML
0.5 INJECTION, SOLUTION INTRAMUSCULAR; INTRAVENOUS; SUBCUTANEOUS
Status: DISCONTINUED | OUTPATIENT
Start: 2021-11-16 | End: 2021-11-16 | Stop reason: HOSPADM

## 2021-11-16 RX ORDER — MEPERIDINE HYDROCHLORIDE 25 MG/ML
12.5 INJECTION INTRAMUSCULAR; INTRAVENOUS; SUBCUTANEOUS
Status: DISCONTINUED | OUTPATIENT
Start: 2021-11-16 | End: 2021-11-16 | Stop reason: HOSPADM

## 2021-11-16 RX ORDER — IPRATROPIUM BROMIDE AND ALBUTEROL SULFATE 2.5; .5 MG/3ML; MG/3ML
3 SOLUTION RESPIRATORY (INHALATION) ONCE AS NEEDED
Status: DISCONTINUED | OUTPATIENT
Start: 2021-11-16 | End: 2021-11-16 | Stop reason: HOSPADM

## 2021-11-16 RX ORDER — FENTANYL CITRATE 50 UG/ML
INJECTION, SOLUTION INTRAMUSCULAR; INTRAVENOUS AS NEEDED
Status: DISCONTINUED | OUTPATIENT
Start: 2021-11-16 | End: 2021-11-16 | Stop reason: SURG

## 2021-11-16 RX ORDER — HYDROCODONE BITARTRATE AND ACETAMINOPHEN 5; 325 MG/1; MG/1
1 TABLET ORAL EVERY 6 HOURS PRN
Status: DISCONTINUED | OUTPATIENT
Start: 2021-11-16 | End: 2021-11-16 | Stop reason: HOSPADM

## 2021-11-16 RX ORDER — SODIUM CHLORIDE, SODIUM LACTATE, POTASSIUM CHLORIDE, CALCIUM CHLORIDE 600; 310; 30; 20 MG/100ML; MG/100ML; MG/100ML; MG/100ML
100 INJECTION, SOLUTION INTRAVENOUS ONCE AS NEEDED
Status: DISCONTINUED | OUTPATIENT
Start: 2021-11-16 | End: 2021-11-16 | Stop reason: HOSPADM

## 2021-11-16 RX ORDER — ROCURONIUM BROMIDE 10 MG/ML
INJECTION, SOLUTION INTRAVENOUS AS NEEDED
Status: DISCONTINUED | OUTPATIENT
Start: 2021-11-16 | End: 2021-11-16 | Stop reason: SURG

## 2021-11-16 RX ORDER — KETOROLAC TROMETHAMINE 30 MG/ML
INJECTION, SOLUTION INTRAMUSCULAR; INTRAVENOUS AS NEEDED
Status: DISCONTINUED | OUTPATIENT
Start: 2021-11-16 | End: 2021-11-16 | Stop reason: SURG

## 2021-11-16 RX ORDER — SODIUM CHLORIDE 0.9 % (FLUSH) 0.9 %
10 SYRINGE (ML) INJECTION EVERY 12 HOURS SCHEDULED
Status: DISCONTINUED | OUTPATIENT
Start: 2021-11-16 | End: 2021-11-16 | Stop reason: HOSPADM

## 2021-11-16 RX ORDER — MIDAZOLAM HYDROCHLORIDE 1 MG/ML
1 INJECTION INTRAMUSCULAR; INTRAVENOUS
Status: DISCONTINUED | OUTPATIENT
Start: 2021-11-16 | End: 2021-11-16 | Stop reason: HOSPADM

## 2021-11-16 RX ORDER — IBUPROFEN 600 MG/1
600 TABLET ORAL EVERY 6 HOURS PRN
Qty: 30 TABLET | Refills: 0 | Status: SHIPPED | OUTPATIENT
Start: 2021-11-16 | End: 2021-12-16

## 2021-11-16 RX ORDER — SODIUM CHLORIDE 0.9 % (FLUSH) 0.9 %
10 SYRINGE (ML) INJECTION AS NEEDED
Status: DISCONTINUED | OUTPATIENT
Start: 2021-11-16 | End: 2021-11-16 | Stop reason: HOSPADM

## 2021-11-16 RX ORDER — MIDAZOLAM HYDROCHLORIDE 1 MG/ML
INJECTION INTRAMUSCULAR; INTRAVENOUS AS NEEDED
Status: DISCONTINUED | OUTPATIENT
Start: 2021-11-16 | End: 2021-11-16 | Stop reason: SURG

## 2021-11-16 RX ORDER — SODIUM CHLORIDE 9 MG/ML
125 INJECTION, SOLUTION INTRAVENOUS CONTINUOUS
Status: DISCONTINUED | OUTPATIENT
Start: 2021-11-16 | End: 2021-11-16 | Stop reason: HOSPADM

## 2021-11-16 RX ORDER — FENTANYL CITRATE 50 UG/ML
50 INJECTION, SOLUTION INTRAMUSCULAR; INTRAVENOUS
Status: DISCONTINUED | OUTPATIENT
Start: 2021-11-16 | End: 2021-11-16 | Stop reason: HOSPADM

## 2021-11-16 RX ADMIN — PROPOFOL 200 MG: 10 INJECTION, EMULSION INTRAVENOUS at 12:10

## 2021-11-16 RX ADMIN — SODIUM CHLORIDE 1000 ML: 9 INJECTION, SOLUTION INTRAVENOUS at 01:14

## 2021-11-16 RX ADMIN — ONDANSETRON 4 MG: 2 INJECTION INTRAMUSCULAR; INTRAVENOUS at 03:55

## 2021-11-16 RX ADMIN — FENTANYL CITRATE 100 MCG: 50 INJECTION INTRAMUSCULAR; INTRAVENOUS at 12:04

## 2021-11-16 RX ADMIN — SODIUM CHLORIDE, POTASSIUM CHLORIDE, SODIUM LACTATE AND CALCIUM CHLORIDE: 600; 310; 30; 20 INJECTION, SOLUTION INTRAVENOUS at 12:04

## 2021-11-16 RX ADMIN — NEOSTIGMINE 2.5 MG: 1 INJECTION INTRAVENOUS at 12:50

## 2021-11-16 RX ADMIN — MORPHINE SULFATE 2 MG: 2 INJECTION, SOLUTION INTRAMUSCULAR; INTRAVENOUS at 06:28

## 2021-11-16 RX ADMIN — HYDROCODONE BITARTRATE AND ACETAMINOPHEN 1 TABLET: 5; 325 TABLET ORAL at 15:23

## 2021-11-16 RX ADMIN — KETOROLAC TROMETHAMINE 30 MG: 30 INJECTION, SOLUTION INTRAMUSCULAR at 12:14

## 2021-11-16 RX ADMIN — SODIUM CHLORIDE 125 ML/HR: 9 INJECTION, SOLUTION INTRAVENOUS at 03:55

## 2021-11-16 RX ADMIN — FENTANYL CITRATE 100 MCG: 50 INJECTION INTRAMUSCULAR; INTRAVENOUS at 12:59

## 2021-11-16 RX ADMIN — FAMOTIDINE 20 MG: 10 INJECTION INTRAVENOUS at 12:04

## 2021-11-16 RX ADMIN — DEXAMETHASONE SODIUM PHOSPHATE 8 MG: 4 INJECTION, SOLUTION INTRA-ARTICULAR; INTRALESIONAL; INTRAMUSCULAR; INTRAVENOUS; SOFT TISSUE at 12:14

## 2021-11-16 RX ADMIN — MIDAZOLAM 2 MG: 1 INJECTION INTRAMUSCULAR; INTRAVENOUS at 12:04

## 2021-11-16 RX ADMIN — LIDOCAINE HYDROCHLORIDE 100 MG: 20 INJECTION, SOLUTION INFILTRATION; PERINEURAL at 12:10

## 2021-11-16 RX ADMIN — MORPHINE SULFATE 2 MG: 2 INJECTION, SOLUTION INTRAMUSCULAR; INTRAVENOUS at 03:55

## 2021-11-16 RX ADMIN — CEFOXITIN 2 G: 2 INJECTION, POWDER, FOR SOLUTION INTRAVENOUS at 12:14

## 2021-11-16 RX ADMIN — SODIUM CHLORIDE, POTASSIUM CHLORIDE, SODIUM LACTATE AND CALCIUM CHLORIDE 125 ML/HR: 600; 310; 30; 20 INJECTION, SOLUTION INTRAVENOUS at 10:58

## 2021-11-16 RX ADMIN — MORPHINE SULFATE 2 MG: 2 INJECTION, SOLUTION INTRAMUSCULAR; INTRAVENOUS at 05:21

## 2021-11-16 RX ADMIN — GLYCOPYRROLATE 0.4 MG: 0.2 INJECTION, SOLUTION INTRAMUSCULAR; INTRAVENOUS at 12:50

## 2021-11-16 RX ADMIN — METOCLOPRAMIDE 10 MG: 5 INJECTION, SOLUTION INTRAMUSCULAR; INTRAVENOUS at 15:23

## 2021-11-16 RX ADMIN — ONDANSETRON 4 MG: 2 INJECTION INTRAMUSCULAR; INTRAVENOUS at 12:04

## 2021-11-16 RX ADMIN — ROCURONIUM BROMIDE 20 MG: 10 SOLUTION INTRAVENOUS at 12:10

## 2021-11-16 NOTE — OP NOTE
LAPAROSCOPIC EXPLORATION FOR ECTOPIC PREGNANCY  Procedure Note    Urvashi Garrisonenship  11/16/2021    Pre-op Diagnosis:   Left tubal pregnancy without intrauterine pregnancy [O00.102]    Post-op Diagnosis:     Post-Op Diagnosis Codes:     * Left tubal pregnancy without intrauterine pregnancy [O00.102]  Pelvic peritoneal lesions    Procedure(s):  Laparoscopic left salpingostomy  Multiple peritoneal biopsies    Surgeon(s):  Karel Salinas DO    Anesthesia: General    Estimated Blood Loss: minimal    Specimens:  Order Name Source Comment Collection Info Order Time   TISSUE PATHOLOGY EXAM Uterus  Collected By: Karel Salinas DO 11/16/2021 12:50 PM     Release to patient   Immediate              Procedure:  The patient was taken to the operating room, given general anesthesia, prepped and draped in the usual sterile fashion.  The bladder was drained with a straight catheter.  A speculum was placed into the vagina and a uterine manipulator was placed.  The surgeon was regloved and attention made to the abdomen.      A one centimeter incision was made into the umbilicus and an OPTIVIEW trocar was placed into the abdomen under direct visualization using the laparoscope.  There was blood visualized when we entered the abdomen.  The abdomen was filled with CO2 gas up to 15mm mercury pressure.  A second eight millimeter trocar was placed suprapubically in the midline.  We placed a third 5 mm trocar in the left lower quadrant.     Next we suctioned out some of the excess blood and made attention to the pelvis.  Left fallopian tube was swollen with an ectopic.  We put it up into the visual field and made a 3 cm incision over the area using the Bovie cautery.  We remove the blood clot and ectopic pregnancy and removed through a 5 mm bag.  Then used the cautery to cauterize all areas of bleeding on the tube and it was hemostatic.    We cleaned everything up in the pelvis with all the clots.  Once we did this we  noticed that there were some irregular fleshy areas in the posterior cul-de-sac and on the posterior uterus.  We used a biopsy forceps to biopsy several of these and sent them to pathology.  We used cautery for hemostasis.    We removed the CO2 gas and trocars.  The skin incisions were closed with a 4-0 monocryl and surgical adhesive.      Sponge, lap and needle counts were correct.       Findings: There was a ruptured left tubal pregnancy.  There was probably about 300 mL of blood in the abdomen.  There were several irregular areas on the posterior uterus and posterior cul-de-sac that were biopsied.  I think this was likely endometriosis but difficult to say for sure.    Complications: none    Grafts or Implants: NA    Karel Salinas, DO     Date: 11/16/2021  Time: 12:59 EST

## 2021-11-16 NOTE — ANESTHESIA POSTPROCEDURE EVALUATION
Patient: Uravshi Rinaldi    Procedure Summary     Date: 11/16/21 Room / Location: [facility] OR 04 /  [facility] OR    Anesthesia Start: 1204 Anesthesia Stop: 1300    Procedure: LAPAROSCOPIC EXPLORATION FOR ECTOPIC PREGNANCY with Left Salpingostomy (Left Vagina) Diagnosis:       Left tubal pregnancy without intrauterine pregnancy      (Left tubal pregnancy without intrauterine pregnancy [O00.102])    Surgeons: Karel Salinas DO Provider: Jefferson Constantino MD    Anesthesia Type: general ASA Status: 2 - Emergent          Anesthesia Type: general    Vitals  No vitals data found for the desired time range.          Post Anesthesia Care and Evaluation    Patient location during evaluation: PACU  Patient participation: complete - patient participated  Level of consciousness: awake  Pain score: 0  Pain management: adequate  Airway patency: patent  Anesthetic complications: No anesthetic complications  PONV Status: none  Cardiovascular status: acceptable  Respiratory status: acceptable  Hydration status: acceptable

## 2021-11-16 NOTE — ANESTHESIA PROCEDURE NOTES
Airway  Urgency: elective    Date/Time: 11/16/2021 12:11 PM  Airway not difficult    General Information and Staff    Patient location during procedure: OR  CRNA: Faviola Sahu CRNA    Indications and Patient Condition  Indications for airway management: airway protection    Preoxygenated: yes  MILS maintained throughout  Mask difficulty assessment: 1 - vent by mask    Final Airway Details  Final airway type: endotracheal airway      Successful airway: ETT  Cuffed: yes   Successful intubation technique: direct laryngoscopy  Facilitating devices/methods: intubating stylet  Endotracheal tube insertion site: oral  Blade: Syeda  Blade size: 3  ETT size (mm): 7.0  Cormack-Lehane Classification: grade IIa - partial view of glottis  Placement verified by: chest auscultation and capnometry   Measured from: lips  ETT/EBT  to lips (cm): 20  Number of attempts at approach: 1  Assessment: lips, teeth, and gum same as pre-op and atraumatic intubation

## 2021-11-16 NOTE — PLAN OF CARE
Problem: Adult Inpatient Plan of Care  Goal: Plan of Care Review  Outcome: Met  Flowsheets  Taken 11/16/2021 1630 by Edelmira Duarte RN  Outcome Summary: patient is doing well, scant vag bleeding. patient is being discharged home in stable condition.  Taken 11/16/2021 1313 by Brenda Coto RN  Plan of Care Reviewed With: patient  Taken 11/16/2021 0541 by Preeti Aguilar RN  Progress: no change  Goal: Patient-Specific Goal (Individualized)  Outcome: Met  Goal: Absence of Hospital-Acquired Illness or Injury  Outcome: Met  Intervention: Identify and Manage Fall Risk  Recent Flowsheet Documentation  Taken 11/16/2021 1600 by Edelmira Duarte RN  Safety Promotion/Fall Prevention: safety round/check completed  Taken 11/16/2021 1530 by Edelmira Duarte RN  Safety Promotion/Fall Prevention: safety round/check completed  Taken 11/16/2021 1523 by Edelmira Duarte RN  Safety Promotion/Fall Prevention: safety round/check completed  Taken 11/16/2021 1500 by Edelmira Duarte RN  Safety Promotion/Fall Prevention: safety round/check completed  Taken 11/16/2021 1445 by Edelmira Duarte RN  Safety Promotion/Fall Prevention: safety round/check completed  Taken 11/16/2021 1430 by Edelmira Duarte RN  Safety Promotion/Fall Prevention: safety round/check completed  Taken 11/16/2021 1415 by Edelmira Duarte RN  Safety Promotion/Fall Prevention: safety round/check completed  Taken 11/16/2021 1345 by Edelmira Duarte RN  Safety Promotion/Fall Prevention: safety round/check completed  Taken 11/16/2021 1000 by Edelmira Duarte RN  Safety Promotion/Fall Prevention: safety round/check completed  Taken 11/16/2021 0945 by Edelmira Duarte RN  Safety Promotion/Fall Prevention: safety round/check completed  Taken 11/16/2021 0800 by Edelmira Duarte RN  Safety Promotion/Fall Prevention: safety round/check completed  Intervention: Prevent Skin Injury  Recent  Flowsheet Documentation  Taken 11/16/2021 1345 by Edelmira Duarte, RN  Body Position: supine  Taken 11/16/2021 0800 by Edelmira Duarte RN  Body Position: position changed independently  Goal: Optimal Comfort and Wellbeing  Outcome: Met  Intervention: Provide Person-Centered Care  Recent Flowsheet Documentation  Taken 11/16/2021 1345 by Edelmira Duarte RN  Trust Relationship/Rapport: care explained  Taken 11/16/2021 0800 by Edelmira Duaret RN  Trust Relationship/Rapport: care explained  Goal: Readiness for Transition of Care  Outcome: Met   Goal Outcome Evaluation:              Outcome Summary: patient is doing well, scant vag bleeding. patient is being discharged home in stable condition.

## 2021-11-16 NOTE — ED PROVIDER NOTES
[facility]  emergency department encounter        Pt Name: Urvashi Rinaldi  MRN: [id number]  Birthdate 1990  Date of evaluation: 11/16/2021    Chief Complaint   Patient presents with   • Ectopic Pregnancy             HISTORY OF PRESENT ILLNESS:   Urvashi Rinaldi is a 31 y.o. female patient denies past medical history, chart review notes hypothyroidism presents G2, P1 ported 6 weeks with known ectopic pregnancy.  Patient was seen and evaluated here 3 days ago noted to have left-sided ectopic pregnancy.  Patient administered methotrexate.  Patient had follow-up appointment with her OB/GYN yesterday 11/15 with repeat ultrasound at 3:30 PM, no reported rupture.  Determined watchful waiting and come to the emergency department for pain exacerbation.  Pain is exacerbated patient now here.  Endorses nausea without vomiting.  Sharp left lower quadrant abdominal pain without radiation.  Scant bloody vaginal output, no other abnormal discharge or malodor.    Obstetrician is Dr. Salinas.    hCG 3 days ago 1192    No other exacerbating or alleviating factors other than as noted above.  Severity: Severe    PCP: Provider, No Known          REVIEW OF SYSTEMS:     Review of Systems   Constitutional: Negative for fever.   HENT: Negative for congestion and rhinorrhea.    Respiratory: Negative for cough and shortness of breath.    Cardiovascular: Negative for chest pain.   Gastrointestinal: Positive for abdominal pain and nausea. Negative for vomiting.   Genitourinary: Positive for vaginal bleeding. Negative for dysuria.   Musculoskeletal: Negative for myalgias.   Skin: Negative for rash.   Neurological: Negative for headaches.   Psychiatric/Behavioral: Negative for confusion.       Review of systems otherwise as per HPI.          PREVIOUS HISTORY:         Past Medical History:   Diagnosis Date   • Hyperthyroidism    • Urinary tract infection            Past Surgical History:   Procedure Laterality Date    • WISDOM TOOTH EXTRACTION  2014           Social History     Socioeconomic History   • Marital status:    Tobacco Use   • Smoking status: Never Smoker   • Smokeless tobacco: Never Used   Substance and Sexual Activity   • Alcohol use: Not Currently   • Drug use: Never           Family History   Problem Relation Age of Onset   • Hypertension Father    • Heart valve disorder Father    • Hypertension Paternal Grandmother          Current Outpatient Medications   Medication Instructions   • ferrous sulfate 325 mg, Oral, Daily   • hydroCHLOROthiazide (MICROZIDE) 12.5 mg, Oral, Every Morning   • ibuprofen (ADVIL,MOTRIN) 800 mg, Oral, Every 6 Hours PRN   • labetalol (NORMODYNE) 300 mg, Oral, 3 Times Daily         Allergies:  Patient has no known allergies.    Medications, allergies and past medical, surgical, family, and social history reviewed.        PHYSICAL EXAM:     Physical Exam  Vitals and nursing note reviewed.   Constitutional:       General: She is not in acute distress.  HENT:      Head: Normocephalic and atraumatic.   Eyes:      Extraocular Movements: Extraocular movements intact.      Conjunctiva/sclera: Conjunctivae normal.   Cardiovascular:      Rate and Rhythm: Regular rhythm. Tachycardia present.   Pulmonary:      Effort: Pulmonary effort is normal. No respiratory distress.   Abdominal:      General: Abdomen is flat. There is no distension.      Tenderness: There is abdominal tenderness (Focal left lower quadrant abdominal tenderness to palpation.).   Musculoskeletal:         General: No deformity. Normal range of motion.      Cervical back: Normal range of motion. No rigidity.   Neurological:      General: No focal deficit present.      Mental Status: She is alert and oriented to person, place, and time.   Psychiatric:         Mood and Affect: Mood normal.         Behavior: Behavior normal.             COMPLETED DIAGNOSTIC STUDIES AND INTERVENTIONS:     Lab Results (last 24 hours)     Procedure  Component Value Units Date/Time    COVID PRE-OP / PRE-PROCEDURE SCREENING ORDER (NO ISOLATION) - Swab, Nasopharynx [[id number]]  (Normal) Collected: 11/16/21 0044    Specimen: Swab from Nasopharynx Updated: 11/16/21 0107    Narrative:      The following orders were created for panel order COVID PRE-OP / PRE-PROCEDURE SCREENING ORDER (NO ISOLATION) - Swab, Nasopharynx.  Procedure                               Abnormality         Status                     ---------                               -----------         ------                     COVID-19 and FLU A/B PCR...[[id number]]  Normal              Final result                 Please view results for these tests on the individual orders.    COVID-19 and FLU A/B PCR - Swab, Nasopharynx [[id number]]  (Normal) Collected: 11/16/21 0044    Specimen: Swab from Nasopharynx Updated: 11/16/21 0107     COVID19 Not Detected     Influenza A PCR Not Detected     Influenza B PCR Not Detected    Narrative:      Fact sheet for providers: https://www.fda.gov/media/232381/download    Fact sheet for patients: https://www.fda.gov/media/824838/download    Test performed by PCR.    CBC & Differential [[id number]]  (Abnormal) Collected: 11/16/21 0056    Specimen: Blood Updated: 11/16/21 0111    Narrative:      The following orders were created for panel order CBC & Differential.  Procedure                               Abnormality         Status                     ---------                               -----------         ------                     CBC Auto Differential[[id number]]        Abnormal            Final result                 Please view results for these tests on the individual orders.    Basic Metabolic Panel [[id number]]  (Abnormal) Collected: 11/16/21 0056    Specimen: Blood Updated: 11/16/21 0130     Glucose 145 mg/dL      BUN 13 mg/dL      Creatinine 0.66 mg/dL      Sodium 138 mmol/L      Potassium 3.7 mmol/L      Comment: Slight hemolysis detected by analyzer. Results may  be affected.        Chloride 105 mmol/L      CO2 20.0 mmol/L      Calcium 8.8 mg/dL      eGFR Non African Amer 106 mL/min/1.73      BUN/Creatinine Ratio 19.7     Anion Gap 13.0 mmol/L     Narrative:      GFR Normal >60  Chronic Kidney Disease <60  Kidney Failure <15      Protime-INR [[id number]]  (Normal) Collected: 11/16/21 0056    Specimen: Blood Updated: 11/16/21 0120     Protime 13.0 Seconds      INR 0.94    Narrative:      Suggested INR therapeutic range for stable oral anticoagulant therapy:    Low Intensity therapy:   1.5-2.0  Moderate Intensity therapy:   2.0-3.0  High Intensity therapy:   2.5-4.0    aPTT [[id number]]  (Normal) Collected: 11/16/21 0056    Specimen: Blood Updated: 11/16/21 0120     PTT 30.5 seconds     Narrative:      PTT Heparin Therapeutic Range:  59 - 95 seconds      hCG, Quantitative, Pregnancy [[id number]] Collected: 11/16/21 0056    Specimen: Blood Updated: 11/16/21 0137     HCG Quantitative 1,133.00 mIU/mL     Narrative:      HCG Ranges by Gestational Age    Females - non-pregnant premenopausal   </= 1mIU/mL HCG  Females - postmenopausal               </= 7mIU/mL HCG    3 Weeks         5.8 -    71.2 mIU/mL  4 Weeks         9.5 -     750 mIU/mL  5 Weeks         217 -   7,138 mIU/mL  6 Weeks         158 -  31,795 mIU/mL  7 Weeks       3,697 - 163,563 mIU/mL  8 Weeks      32,065 - 149,571 mIU/mL  9 Weeks      63,803 - 151,410 mIU/mL  10 Weeks     46,509 - 186,977 mIU/mL  12 Weeks     27,832 - 210,612 mIU/mL  14 Weeks     13,950 -  62,530 mIU/mL  15 Weeks     12,039 -  70,971 mIU/mL  16 Weeks      9,040 -  56,451 mIU/mL  17 Weeks      8,175 -  55,868 mIU/mL  18 Weeks      8,099 -  58,176 mIU/mL  Results may be falsely decreased if patient taking Biotin.      C-reactive Protein [[id number]]  (Abnormal) Collected: 11/16/21 0056    Specimen: Blood Updated: 11/16/21 0129     C-Reactive Protein 1.45 mg/dL     CBC Auto Differential [[id number]]  (Abnormal) Collected: 11/16/21 0056    Specimen: Blood  Updated: 11/16/21 0111     WBC 8.86 10*3/mm3      RBC 4.51 10*6/mm3      Hemoglobin 12.1 g/dL      Hematocrit 38.3 %      MCV 84.9 fL      MCH 26.8 pg      MCHC 31.6 g/dL      RDW 14.6 %      RDW-SD 44.9 fl      MPV 9.4 fL      Platelets 347 10*3/mm3      Neutrophil % 65.5 %      Lymphocyte % 27.3 %      Monocyte % 4.9 %      Eosinophil % 1.2 %      Basophil % 0.5 %      Immature Grans % 0.6 %      Neutrophils, Absolute 5.81 10*3/mm3      Lymphocytes, Absolute 2.42 10*3/mm3      Monocytes, Absolute 0.43 10*3/mm3      Eosinophils, Absolute 0.11 10*3/mm3      Basophils, Absolute 0.04 10*3/mm3      Immature Grans, Absolute 0.05 10*3/mm3      nRBC 0.0 /100 WBC     Urinalysis With Culture If Indicated - Urine, Clean Catch [[id number]]  (Abnormal) Collected: 11/16/21 0116    Specimen: Urine, Clean Catch Updated: 11/16/21 0138     Color, UA Yellow     Appearance, UA Cloudy     pH, UA 6.5     Specific Gravity, UA 1.029     Glucose, UA Negative     Ketones, UA Negative     Bilirubin, UA Negative     Blood, UA Large (3+)     Protein, UA Negative     Leuk Esterase, UA Trace     Nitrite, UA Negative     Urobilinogen, UA 1.0 E.U./dL    Urinalysis, Microscopic Only - Urine, Clean Catch [[id number]]  (Abnormal) Collected: 11/16/21 0116    Specimen: Urine, Clean Catch Updated: 11/16/21 0152     RBC, UA Too Numerous to Count /HPF      WBC, UA 3-5 /HPF      Bacteria, UA Trace /HPF      Squamous Epithelial Cells, UA 7-12 /HPF      Hyaline Casts, UA None Seen /LPF      Methodology Manual Light Microscopy            US Ob Transvaginal   Final Result      1. Overall, the exam is similar in appearance to the recent prior exam. The endometrium is thickened and heterogeneous with probably some endometrial fluid. No intrauterine pregnancy is seen.   2. The right ovary remains normal.   3. Both ovaries show perfusion by Doppler.   4. Small cystic lesion adjacent to or arising from the left ovary is similar to prior. There also appears to be a  separate corpus luteum in the left ovary.      Signer Name: Boni Weeks MD    Signed: 11/16/2021 2:27 AM    Workstation Name: [facility]     [facility]            New Medications Ordered This Visit   Medications   • sodium chloride 0.9 % bolus 1,000 mL   • sodium chloride 0.9 % infusion   • morphine injection 2 mg   • ondansetron (ZOFRAN) injection 4 mg         Procedures            MEDICAL DECISION-MAKING AND ED COURSE:     ED Course as of 11/16/21 0356   Tue Nov 16, 2021   0107 MDM: 31-year-old female with known ectopic pregnancy presenting for exacerbated left lower quadrant abdominal pain.  Had an ultrasound at 3:30 PM yesterday, 9 hours ago.  Will repeat for any signs of rupture and plan to discuss with her obstetrician team. []   0157 No UTI. [KP]   0157 Hemoglobin: 12.1 [KP]   0217 Signout to Dr. Garza: 31-year-old female with known ectopic pregnancy.  Call on-call obstetrician when TVUS results return. [KP]   0315 Discussed with Dr. Narayan, will admit to women's health for observation and likely intervention in the morning.  Keep n.p.o. and type and cross for 2 units. [DH]   0330 Rhogam is not needed. [DH]      ED Course User Index  [] Maynor Garza MD  [] Victor Hugo Hickey MD       ?      FINAL IMPRESSION:       1. Left tubal pregnancy without intrauterine pregnancy         The complaints listed here are new problems to this examiner.      FOLLOW-UP  No follow-up provider specified.    DISPOSITION  ED Disposition     ED Disposition Condition Comment    Decision to Admit  Level of Care: Med/Surg [1]   Diagnosis: Left tubal pregnancy without intrauterine pregnancy [[id number]]   Admitting Physician: JOEL NARAYAN [[id number]]   Attending Physician: JOEL NARAYAN [[id number]]                  This care is provided during an unprecedented national emergency due to the Novel Coronavirus (COVID-19). COVID-19 infections and transmission risks place heavy  strains on healthcare resources. As this pandemic evolves, the Hospital and providers strive to respond fluidly, to remain operational, and to provide care relative to available resources and information. Outcomes are unpredictable and treatments are without well-defined guidelines. Further, the impact of COVID-19 on all aspects of emergency care, including the impact to patients seeking care for reasons other than COVID-19, is unavoidable during this national emergency.    This note was dictated using a [id number] tool. Occasional wrong-word or 'sound-a-like' substitutions may have occurred due to the inherent limitations of voice recognition software. ?Read the chart carefully and recognize, using context, where substitutions have occurred.    Maynor Garza MD  03:56 EST  11/16/2021             Maynor Garza MD  11/16/21 0357

## 2021-11-16 NOTE — H&P
Patient Care Team:  Provider, No Known as PCP - General    Chief complaint left-sided pelvic pain    Subjective     Patient is a 31 y.o. female  2 para 1 who presents secondary to severe left-sided pelvic pain.  She has a known ectopic and received methotrexate on Saturday.  I saw her in the office yesterday and an ultrasound showed that the ectopic was a little bit larger but the amount of free fluid was relatively the same.  She was controlling her pain with Tylenol and doing fairly well.  She started having increasing pain last night and so she came to the hospital.  This morning she desires to proceed with surgery as and she is in significant pain requiring narcotic analgesia.  We discussed laparoscopic salpingostomy versus salpingectomy.    Review of Systems   Pertinent items are noted in HPI    History  Past Medical History:   Diagnosis Date   • Hyperthyroidism    • Urinary tract infection      Past Surgical History:   Procedure Laterality Date   • WISDOM TOOTH EXTRACTION       Family History   Problem Relation Age of Onset   • Hypertension Father    • Heart valve disorder Father    • Hypertension Paternal Grandmother      Social History     Tobacco Use   • Smoking status: Never Smoker   • Smokeless tobacco: Never Used   Substance Use Topics   • Alcohol use: Not Currently   • Drug use: Never     E-cigarette/Vaping     E-cigarette/Vaping Substances     No medications prior to admission.     Allergies:  Patient has no known allergies.    Objective     Vital Signs  Temp:  [98.2 °F (36.8 °C)-100 °F (37.8 °C)] 100 °F (37.8 °C)  Heart Rate:  [110-133] 130  Resp:  [17-20] 18  BP: (113-168)/(56-94) 113/62    Physical Exam:      General Appearance:    Alert, cooperative, in no acute distress   Head:    Normocephalic, without obvious abnormality, atraumatic   Eyes:            Lids and lashes normal, conjunctivae and sclerae normal, no   icterus, no pallor, corneas clear, PERRLA   Ears:    Ears appear  intact with no abnormalities noted   Throat:   No oral lesions, no thrush, oral mucosa moist   Neck:   No adenopathy, supple, trachea midline, no thyromegaly, no     carotid bruit, no JVD   Back:     No kyphosis present, no scoliosis present, no skin lesions,       erythema or scars, no tenderness to percussion or                   palpation,   range of motion normal   Lungs:     Clear to auscultation,respirations regular, even and                   unlabored    Heart:    Regular rhythm and normal rate, normal S1 and S2, no            murmur, no gallop, no rub, no click   Breast Exam:    Deferred   Abdomen:     Normal bowel sounds, no masses, no organomegaly, soft        non-tender, non-distended, no guarding, no rebound                 tenderness   Genitalia:    Deferred   Extremities:   Moves all extremities well, no edema, no cyanosis, no              redness   Pulses:   Pulses palpable and equal bilaterally   Skin:   No bleeding, bruising or rash   Lymph nodes:   No palpable adenopathy   Neurologic:   Cranial nerves 2 - 12 grossly intact, sensation intact, DTR        present and equal bilaterally       Results Review:    I reviewed the patient's new clinical results.    Assessment/Plan       Left tubal pregnancy without intrauterine pregnancy    To the OR this morning for laparoscopic left salpingostomy versus salpingectomy.  We discussed the risks and benefits in detail.    I discussed the patient's findings and my recommendations with patient and family.     Karel Salinas DO  11/16/21  08:26 EST

## 2021-11-16 NOTE — ED NOTES
I called Dr. Reagan per Dr. Garza. She answered, call transferred to Dr. Garza.     Jae Zamora  11/16/21 [id number]

## 2021-11-16 NOTE — ANESTHESIA PREPROCEDURE EVALUATION
Anesthesia Evaluation     Patient summary reviewed and Nursing notes reviewed   NPO Solid Status: > 8 hours  NPO Liquid Status: > 8 hours           Airway   Mallampati: I  Neck ROM: full  Dental - normal exam     Pulmonary     breath sounds clear to auscultation  Cardiovascular   Exercise tolerance: good (4-7 METS)    Rhythm: regular  Rate: normal    (+) hypertension,       Neuro/Psych  GI/Hepatic/Renal/Endo      Musculoskeletal     Abdominal     Abdomen: soft.   Substance History      OB/GYN    (+) Pregnant, Preeclampsia,         Other                        Anesthesia Plan    ASA 2 - emergent     general     intravenous induction     Anesthetic plan, all risks, benefits, and alternatives have been provided, discussed and informed consent has been obtained with: patient.    Plan discussed with CRNA.

## 2021-11-16 NOTE — ANESTHESIA POSTPROCEDURE EVALUATION
Patient: Urvashi Rinaldi    Procedure Summary     Date: 11/16/21 Room / Location: [facility] OR 04 /  [facility] OR    Anesthesia Start: 1204 Anesthesia Stop: 1300    Procedure: LAPAROSCOPIC EXPLORATION FOR ECTOPIC PREGNANCY with Left Salpingostomy (Left Vagina) Diagnosis:       Left tubal pregnancy without intrauterine pregnancy      (Left tubal pregnancy without intrauterine pregnancy [O00.102])    Surgeons: Karel Salinas DO Provider: Jefferson Constantino MD    Anesthesia Type: general ASA Status: 2 - Emergent          Anesthesia Type: general    Vitals  Vitals Value Taken Time   /81 11/16/21 1331   Temp 99.6 °F (37.6 °C) 11/16/21 1301   Pulse 106 11/16/21 1331   Resp 13 11/16/21 1331   SpO2 96 % 11/16/21 1331           Post Anesthesia Care and Evaluation    Patient location during evaluation: PACU  Patient participation: complete - patient participated  Level of consciousness: awake  Pain score: 0  Pain management: adequate  Airway patency: patent  Anesthetic complications: No anesthetic complications  PONV Status: none  Cardiovascular status: acceptable  Respiratory status: acceptable  Hydration status: acceptable

## 2021-11-20 LAB
BH BB BLOOD EXPIRATION DATE: NORMAL
BH BB BLOOD EXPIRATION DATE: NORMAL
BH BB BLOOD TYPE BARCODE: 5100
BH BB BLOOD TYPE BARCODE: 5100
BH BB DISPENSE STATUS: NORMAL
BH BB DISPENSE STATUS: NORMAL
BH BB PRODUCT CODE: NORMAL
BH BB PRODUCT CODE: NORMAL
BH BB UNIT NUMBER: NORMAL
BH BB UNIT NUMBER: NORMAL
CROSSMATCH INTERPRETATION: NORMAL
CROSSMATCH INTERPRETATION: NORMAL
UNIT  ABO: NORMAL
UNIT  ABO: NORMAL
UNIT  RH: NORMAL
UNIT  RH: NORMAL

## 2021-11-22 NOTE — DISCHARGE SUMMARY
Date of Discharge: 11/22/21     Discharge Diagnosis:   Left tubal pregnancy with rupture    Problem List:    Left tubal pregnancy without intrauterine pregnancy      Presenting Problem/History of Present Illness  Left tubal pregnancy without intrauterine pregnancy [O00.102]      Hospital Course  Patient is a 31 y.o. female presented with the above diagnosis and underwent elective surgery.  She did well.     Procedures Performed  Procedure(s):  LAPAROSCOPIC EXPLORATION FOR ECTOPIC PREGNANCY with Left Salpingostomy       Consults:   Consults     No orders found from 10/18/2021 to 11/17/2021.          Pertinent Test Results:    Condition on Discharge: Stable  Vital Signs       Discharge Disposition  Home or Self Care    Discharge Medications     Discharge Medications      New Medications      Instructions Start Date   HYDROcodone-acetaminophen 5-325 MG per tablet  Commonly known as: NORCO   1 tablet, Oral, Every 4 Hours PRN      ibuprofen 600 MG tablet  Commonly known as: ADVIL,MOTRIN   Take 1 tablet by mouth Every 6 (Six) Hours As Needed for Mild Pain for up to 30 days.             Discharge Diet   Regular    Activity at Discharge  Pelvic rest    Follow-up Appointments  No future appointments.        Time: Discharge 15 min

## 2021-11-23 LAB — LAB AP CASE REPORT: NORMAL

## 2021-12-28 ENCOUNTER — TRANSCRIBE ORDERS (OUTPATIENT)
Dept: ADMINISTRATIVE | Facility: HOSPITAL | Age: 31
End: 2021-12-28

## 2021-12-28 DIAGNOSIS — R22.1 NECK MASS: Primary | ICD-10-CM

## 2022-01-21 ENCOUNTER — HOSPITAL ENCOUNTER (OUTPATIENT)
Dept: ULTRASOUND IMAGING | Facility: HOSPITAL | Age: 32
Discharge: HOME | End: 2022-01-21
Admitting: NURSE PRACTITIONER

## 2022-01-21 DIAGNOSIS — R22.1 NECK MASS: ICD-10-CM

## 2022-01-21 PROCEDURE — 76536 US EXAM OF HEAD AND NECK: CPT | Performed by: RADIOLOGY

## 2022-01-21 PROCEDURE — 76536 US EXAM OF HEAD AND NECK: CPT

## 2022-02-15 ENCOUNTER — OFFICE VISIT (OUTPATIENT)
Dept: ENDOCRINOLOGY | Facility: CLINIC | Age: 32
End: 2022-02-15

## 2022-02-15 ENCOUNTER — LAB (OUTPATIENT)
Dept: LAB | Facility: HOSPITAL | Age: 32
End: 2022-02-15

## 2022-02-15 VITALS
DIASTOLIC BLOOD PRESSURE: 89 MMHG | WEIGHT: 199 LBS | OXYGEN SATURATION: 96 % | HEART RATE: 118 BPM | SYSTOLIC BLOOD PRESSURE: 131 MMHG | BODY MASS INDEX: 31.98 KG/M2 | TEMPERATURE: 98.6 F | HEIGHT: 66 IN

## 2022-02-15 DIAGNOSIS — E06.9 THYROIDITIS: Primary | ICD-10-CM

## 2022-02-15 LAB — ERYTHROCYTE [SEDIMENTATION RATE] IN BLOOD: 30 MM/HR (ref 0–20)

## 2022-02-15 PROCEDURE — 84481 FREE ASSAY (FT-3): CPT | Performed by: NURSE PRACTITIONER

## 2022-02-15 PROCEDURE — 99213 OFFICE O/P EST LOW 20 MIN: CPT | Performed by: NURSE PRACTITIONER

## 2022-02-15 PROCEDURE — 85652 RBC SED RATE AUTOMATED: CPT | Performed by: NURSE PRACTITIONER

## 2022-02-15 PROCEDURE — 84443 ASSAY THYROID STIM HORMONE: CPT | Performed by: NURSE PRACTITIONER

## 2022-02-15 PROCEDURE — 36415 COLL VENOUS BLD VENIPUNCTURE: CPT | Performed by: NURSE PRACTITIONER

## 2022-02-15 PROCEDURE — 84445 ASSAY OF TSI GLOBULIN: CPT | Performed by: NURSE PRACTITIONER

## 2022-02-15 PROCEDURE — 84439 ASSAY OF FREE THYROXINE: CPT | Performed by: NURSE PRACTITIONER

## 2022-02-15 RX ORDER — BUSPIRONE HYDROCHLORIDE 10 MG/1
10 TABLET ORAL 2 TIMES DAILY
COMMUNITY
End: 2022-04-20

## 2022-02-15 RX ORDER — PROPRANOLOL HYDROCHLORIDE 10 MG/1
10 TABLET ORAL DAILY
Qty: 30 TABLET | Refills: 2 | Status: SHIPPED | OUTPATIENT
Start: 2022-02-15 | End: 2022-04-20 | Stop reason: SDUPTHER

## 2022-02-15 RX ORDER — PNV NO.95/FERROUS FUM/FOLIC AC 28MG-0.8MG
TABLET ORAL
COMMUNITY
End: 2022-11-11 | Stop reason: SDUPTHER

## 2022-02-15 NOTE — ASSESSMENT & PLAN NOTE
-Patient appears to be hyperthyroid in office today with tachycardia and tremor.  However, she has moderately enlarged thyroid gland with tenderness.  With her most recent TFT't being in range, suspect that she may have thyroiditis.  Will obtain further labs to help better determine this.  Labs today.  -Start low dose propranolol for symptom control.  -Follow-up in 3 months.

## 2022-02-15 NOTE — PROGRESS NOTES
Chief Complaint   Patient presents with   • Thyroid Problem     Inital encounter        Referring Provider  Priya Goldman APRN HPI   Urvashi Rinaldi is a 31 y.o. female had concerns including Thyroid Problem (Inital encounter).   She was noted to have an enlarged thyroid at her GYN appt.  They did an ultrasound on 01/21/2022 and noted a 1 cm primarily cystic nodule in the right lobe of the thyroid most likely representing a colloid cyst. Left lobe is heterogeneous and shows a 5 mm solid nodule.  IMPRESSION:  Nodules bilaterally with the largest nodule being cystic in  the right lobe and likely representing a colloid cyst.      She had an ectopic pregnancy in Nov 2021.  In 2020 she was noted to have hyperthyroidism around 35+ weeks but gave birth prior to being evaluated.    Most recent TFT's:  12/28/2021:  TSH: 0.518 (0.270-4.200)  Free T4: 1.13 (0.93-1.70)    Birth state: [place]  Previous history of radiation to face/neck: none  Consuming foods high in iodine: none  Family history of thyroid complications: none    The following portions of the patient's history were reviewed and updated as appropriate: allergies, current medications, past family history, past medical history, past social history, past surgical history and problem list.    Past Medical History:   Diagnosis Date   • Hyperthyroidism    • Urinary tract infection      Past Surgical History:   Procedure Laterality Date   • LAPAROSCOPIC EXPLORATION FOR ECTOPIC PREGNANCY Left 11/16/2021    Procedure: LAPAROSCOPIC EXPLORATION FOR ECTOPIC PREGNANCY with Left Salpingostomy;  Surgeon: Karel Salinas DO;  Location: [facility];  Service: Obstetrics/Gynecology;  Laterality: Left;   • WISDOM TOOTH EXTRACTION  2014      Family History   Problem Relation Age of Onset   • Hypertension Father    • Heart valve disorder Father    • Hypertension Paternal Grandmother       Social History     Socioeconomic History   • Marital status:    Tobacco Use  "  • Smoking status: Never Smoker   • Smokeless tobacco: Never Used   Vaping Use   • Vaping Use: Never used   Substance and Sexual Activity   • Alcohol use: Not Currently   • Drug use: Never   • Sexual activity: Defer      No Known Allergies   Current Outpatient Medications on File Prior to Visit   Medication Sig Dispense Refill   • busPIRone (BUSPAR) 10 MG tablet Take 10 mg by mouth 2 (Two) Times a Day.     • Prenatal Vit-Fe Fumarate-FA (Prenatal Vitamin) 27-0.8 MG tablet Take  by mouth.     • [DISCONTINUED] HYDROcodone-acetaminophen (NORCO) 5-325 MG per tablet Take 1 tablet by mouth Every 4 (Four) Hours As Needed for Moderate Pain . 15 tablet 0     No current facility-administered medications on file prior to visit.      Review of Systems   Constitutional: Positive for fatigue. Negative for unexpected weight gain and unexpected weight loss.   Eyes: Negative.    Cardiovascular: Positive for palpitations.   Gastrointestinal: Negative.    Endocrine: Negative for cold intolerance and heat intolerance.   Neurological: Positive for tremors.   Psychiatric/Behavioral: Positive for agitation and sleep disturbance. The patient is nervous/anxious.    All other systems reviewed and are negative.     /89 (BP Location: Right arm, Patient Position: Sitting, Cuff Size: Adult)   Pulse 118   Temp 98.6 °F (37 °C) (Oral)   Ht 167.6 cm (66\")   Wt 90.3 kg (199 lb)   LMP 02/15/2022   SpO2 96%   Breastfeeding No   BMI 32.12 kg/m²      Physical Exam  Vitals reviewed.   Constitutional:       Appearance: Normal appearance.   Eyes:      Extraocular Movements: Extraocular movements intact.   Neck:      Comments: Moderate overall enlargement of the thyroid gland with tenderness. No palpable nodules.  Cardiovascular:      Rate and Rhythm: Regular rhythm. Tachycardia present.      Pulses: Normal pulses.      Heart sounds: Normal heart sounds.   Pulmonary:      Effort: Pulmonary effort is normal.      Breath sounds: Normal breath " sounds.   Skin:     General: Skin is warm.   Neurological:      Mental Status: She is alert and oriented to person, place, and time.   Psychiatric:         Mood and Affect: Mood normal.         Behavior: Behavior normal.         Thought Content: Thought content normal.         Judgment: Judgment normal.       Labs/Imaging  CMP  Lab Results   Component Value Date    GLUCOSE 145 (H) 11/16/2021    BUN 13 11/16/2021    CREATININE 0.66 11/16/2021    EGFRIFNONA 106 11/16/2021    BCR 19.7 11/16/2021    K 3.7 11/16/2021    CO2 20.0 (L) 11/16/2021    CALCIUM 8.8 11/16/2021    ALBUMIN 4.50 11/13/2021    AST 18 11/13/2021    ALT 33 11/13/2021        CBC w/DIFF   Lab Results   Component Value Date    WBC 8.86 11/16/2021    RBC 4.51 11/16/2021    HGB 12.1 11/16/2021    HCT 38.3 11/16/2021    MCV 84.9 11/16/2021    MCH 26.8 11/16/2021    MCHC 31.6 11/16/2021    RDW 14.6 11/16/2021    RDWSD 44.9 11/16/2021    MPV 9.4 11/16/2021     11/16/2021    NEUTRORELPCT 65.5 11/16/2021    LYMPHORELPCT 27.3 11/16/2021    MONORELPCT 4.9 (L) 11/16/2021    EOSRELPCT 1.2 11/16/2021    BASORELPCT 0.5 11/16/2021    AUTOIGPER 0.6 (H) 11/16/2021    NEUTROABS 5.81 11/16/2021    LYMPHSABS 2.42 11/16/2021    MONOSABS 0.43 11/16/2021    EOSABS 0.11 11/16/2021    BASOSABS 0.04 11/16/2021    AUTOIGNUM 0.05 11/16/2021    NRBC 0.0 11/16/2021       TSH  No results found for: TSH    T4  No results found for: FREET4  No results found for: F0EGQYJ    T3  No results found for: T3FREE  No results found for: M2YVHGI    TRAb  No results found for: TSURCPAB    TPO  No results found for: THYROIDAB    No valid procedures specified.    Assessment and Plan    Diagnoses and all orders for this visit:    1. Thyroiditis (Primary)  Assessment & Plan:  -Patient appears to be hyperthyroid in office today with tachycardia and tremor.  However, she has moderately enlarged thyroid gland with tenderness.  With her most recent TFT't being in range, suspect that she may have  thyroiditis.  Will obtain further labs to help better determine this.  Labs today.  -Start low dose propranolol for symptom control.  -Follow-up in 3 months.     Orders:  -     TSH  -     T4, Free  -     T3, Free  -     Thyroid Stimulating Immunoglobulin  -     Sedimentation Rate    Other orders  -     propranolol (INDERAL) 10 MG tablet; Take 1 tablet by mouth Daily. As needed for tremors and palpitations  Dispense: 30 tablet; Refill: 2       No follow-ups on file. The patient was instructed to contact the clinic with any interval questions or concerns.    SHANICE Santamaria   Endocrinology

## 2022-02-16 LAB
T3FREE SERPL-MCNC: 3.37 PG/ML (ref 2–4.4)
T4 FREE SERPL-MCNC: 1.23 NG/DL (ref 0.93–1.7)
TSH SERPL DL<=0.05 MIU/L-ACNC: 0.39 UIU/ML (ref 0.27–4.2)

## 2022-02-17 ENCOUNTER — TELEPHONE (OUTPATIENT)
Dept: ENDOCRINOLOGY | Facility: CLINIC | Age: 32
End: 2022-02-17

## 2022-02-17 LAB — TSI SER-ACNC: <0.1 IU/L (ref 0–0.55)

## 2022-02-17 NOTE — TELEPHONE ENCOUNTER
I called patient per SHANICE Santamaria to inform her that she did in fact have thyroiditis based on recent lab results. Jennifer wants her to stay on current dose of thyroid medication and return to office in 3 months. Patient states she already has follow up appointment with us in 3 months. Patient was appreciative of my call.

## 2022-02-18 ENCOUNTER — PATIENT ROUNDING (BHMG ONLY) (OUTPATIENT)
Dept: ENDOCRINOLOGY | Facility: CLINIC | Age: 32
End: 2022-02-18

## 2022-02-18 NOTE — PROGRESS NOTES
A [employer] message has been sent to the patient for patient rounding with [facility]

## 2022-04-11 ENCOUNTER — TELEPHONE (OUTPATIENT)
Dept: ENDOCRINOLOGY | Facility: CLINIC | Age: 32
End: 2022-04-11

## 2022-04-11 DIAGNOSIS — E06.9 THYROIDITIS: Primary | ICD-10-CM

## 2022-04-11 NOTE — TELEPHONE ENCOUNTER
Pt called and said that she had Thyroiditis   Her previous bloodwork came back as normal per pt but she is having some symptoms such as her thyroid being inflammed and sensitive and she is Not ovulating.  She asked if she can have bloodwork done before her upcoming appt May 17, 2022.

## 2022-04-12 ENCOUNTER — TELEPHONE (OUTPATIENT)
Dept: ENDOCRINOLOGY | Facility: CLINIC | Age: 32
End: 2022-04-12

## 2022-04-13 ENCOUNTER — LAB (OUTPATIENT)
Dept: LAB | Facility: HOSPITAL | Age: 32
End: 2022-04-13

## 2022-04-13 DIAGNOSIS — E06.9 THYROIDITIS: ICD-10-CM

## 2022-04-13 PROCEDURE — 36415 COLL VENOUS BLD VENIPUNCTURE: CPT

## 2022-04-13 PROCEDURE — 84443 ASSAY THYROID STIM HORMONE: CPT

## 2022-04-14 LAB — TSH SERPL DL<=0.05 MIU/L-ACNC: 0.44 UIU/ML (ref 0.27–4.2)

## 2022-04-20 ENCOUNTER — TELEPHONE (OUTPATIENT)
Dept: FAMILY MEDICINE CLINIC | Facility: CLINIC | Age: 32
End: 2022-04-20

## 2022-04-20 ENCOUNTER — OFFICE VISIT (OUTPATIENT)
Dept: FAMILY MEDICINE CLINIC | Facility: CLINIC | Age: 32
End: 2022-04-20

## 2022-04-20 VITALS
SYSTOLIC BLOOD PRESSURE: 128 MMHG | HEART RATE: 129 BPM | WEIGHT: 196.8 LBS | OXYGEN SATURATION: 99 % | TEMPERATURE: 96.8 F | DIASTOLIC BLOOD PRESSURE: 80 MMHG | HEIGHT: 66 IN | BODY MASS INDEX: 31.63 KG/M2

## 2022-04-20 DIAGNOSIS — F41.9 ANXIETY: ICD-10-CM

## 2022-04-20 DIAGNOSIS — R06.02 SOB (SHORTNESS OF BREATH): Primary | ICD-10-CM

## 2022-04-20 DIAGNOSIS — Z91.09 ENVIRONMENTAL ALLERGIES: ICD-10-CM

## 2022-04-20 PROCEDURE — 93000 ELECTROCARDIOGRAM COMPLETE: CPT | Performed by: FAMILY MEDICINE

## 2022-04-20 PROCEDURE — 99214 OFFICE O/P EST MOD 30 MIN: CPT | Performed by: FAMILY MEDICINE

## 2022-04-20 RX ORDER — CETIRIZINE HYDROCHLORIDE 10 MG/1
10 TABLET ORAL DAILY
Qty: 30 TABLET | Refills: 2 | Status: SHIPPED | OUTPATIENT
Start: 2022-04-20 | End: 2022-05-17

## 2022-04-20 RX ORDER — BUSPIRONE HYDROCHLORIDE 30 MG/1
30 TABLET ORAL 2 TIMES DAILY
Qty: 60 TABLET | Refills: 2 | Status: SHIPPED | OUTPATIENT
Start: 2022-04-20 | End: 2022-05-23

## 2022-04-20 RX ORDER — PROPRANOLOL HYDROCHLORIDE 10 MG/1
10 TABLET ORAL 3 TIMES DAILY
Qty: 90 TABLET | Refills: 2 | Status: SHIPPED | OUTPATIENT
Start: 2022-04-20 | End: 2022-05-17

## 2022-04-20 NOTE — TELEPHONE ENCOUNTER
----- Message from SHANICE Hicks sent at 4/20/2022  1:56 PM EDT -----  Please let patient know results are normal. Thank you.         Patient notified & verbalized understanding.

## 2022-04-20 NOTE — PROGRESS NOTES
"Chief Complaint  Shortness of Breath    Subjective          [facility] presents to [facility]  Shortness of Breath  This is a new problem. The current episode started 1 to 4 weeks ago. The problem has been waxing and waning. Associated symptoms include rhinorrhea. Pertinent negatives include no chest pain, syncope or wheezing. Associated symptoms comments: States it is mostly in the morning. States she wakes up and feels anxious to see if she is short of breath or not. Does feel like the shortness of breath is worse by her anxiety. She has tried rest for the symptoms. The treatment provided significant relief.   Anxiety  Presents for initial visit. The problem has been gradually worsening. Symptoms include excessive worry, nervous/anxious behavior and shortness of breath. Patient reports no chest pain. Symptoms occur most days.     Risk factors include a major life event. Her past medical history is significant for anxiety/panic attacks. Past treatments include SSRIs.       Review of Systems   HENT: Positive for rhinorrhea.    Respiratory: Positive for shortness of breath. Negative for wheezing.    Cardiovascular: Negative for chest pain and syncope.   Psychiatric/Behavioral: The patient is nervous/anxious.          Objective   Vital Signs:   /80 (BP Location: Left arm, Patient Position: Sitting, Cuff Size: Adult)   Pulse (!) 129   Temp 96.8 °F (36 °C) (Temporal)   Ht 167.6 cm (66\")   Wt 89.3 kg (196 lb 12.8 oz)   SpO2 99%   BMI 31.76 kg/m²     Physical Exam  Constitutional:       General: She is not in acute distress.     Appearance: Normal appearance. She is well-developed and well-groomed. She is not ill-appearing, toxic-appearing or diaphoretic.   HENT:      Head: Normocephalic.      Right Ear: Tympanic membrane, ear canal and external ear normal.      Left Ear: Tympanic membrane, ear canal and external ear normal.      Nose: Nose normal. No congestion or " rhinorrhea.      Mouth/Throat:      Mouth: Mucous membranes are moist.      Pharynx: Oropharynx is clear. No oropharyngeal exudate or posterior oropharyngeal erythema.   Eyes:      General: Lids are normal.         Right eye: No discharge.         Left eye: No discharge.      Extraocular Movements: Extraocular movements intact.      Pupils: Pupils are equal, round, and reactive to light.   Neck:      Vascular: No carotid bruit.   Cardiovascular:      Rate and Rhythm: Normal rate and regular rhythm.      Pulses: Normal pulses.      Heart sounds: Normal heart sounds. No murmur heard.    No friction rub. No gallop.   Pulmonary:      Effort: Pulmonary effort is normal. No respiratory distress.      Breath sounds: Normal breath sounds. No stridor. No wheezing, rhonchi or rales.   Chest:      Chest wall: No tenderness.   Abdominal:      General: Bowel sounds are normal. There is no distension.      Palpations: Abdomen is soft. There is no mass.      Tenderness: There is no abdominal tenderness. There is no right CVA tenderness, left CVA tenderness, guarding or rebound.      Hernia: No hernia is present.   Musculoskeletal:         General: No swelling or tenderness. Normal range of motion.      Cervical back: Normal range of motion and neck supple. No rigidity or tenderness.      Right lower leg: No edema.      Left lower leg: No edema.   Lymphadenopathy:      Cervical: No cervical adenopathy.   Skin:     General: Skin is warm.      Capillary Refill: Capillary refill takes less than 2 seconds.      Coloration: Skin is not jaundiced.      Findings: No bruising, erythema or rash.   Neurological:      General: No focal deficit present.      Mental Status: She is alert and oriented to person, place, and time.      Motor: Motor function is intact. No weakness.      Coordination: Coordination is intact.      Gait: Gait is intact. Gait normal.   Psychiatric:         Attention and Perception: Attention normal.         Mood and  Affect: Mood normal.         Speech: Speech normal.         Behavior: Behavior normal.         Cognition and Memory: Cognition normal.         Judgment: Judgment normal.        Result Review :            ECG 12 Lead    Date/Time: 4/21/2022 3:37 PM  Performed by: Suki Coy APRN  Authorized by: Suki Coy APRN   Rhythm: sinus tachycardia  Rate: tachycardic  BPM: 109    Clinical impression: normal ECG              Assessment and Plan    Diagnoses and all orders for this visit:    1. SOB (shortness of breath) (Primary)  -     XR Chest PA & Lateral (In Office)  -     ECG 12 Lead    2. Anxiety  -     propranolol (INDERAL) 10 MG tablet; Take 1 tablet by mouth 3 (Three) Times a Day for 30 days. As needed for tremors and palpitations  Dispense: 90 tablet; Refill: 2  -     busPIRone (BUSPAR) 30 MG tablet; Take 1 tablet by mouth 2 (Two) Times a Day for 30 days.  Dispense: 60 tablet; Refill: 2    3. Environmental allergies  -     cetirizine (zyrTEC) 10 MG tablet; Take 1 tablet by mouth Daily.  Dispense: 30 tablet; Refill: 2      Patient's Body mass index is 31.76 kg/m². indicating that she is obese (BMI >30). Obesity-related health conditions include the following: none. Obesity is unchanged. BMI is is above average; BMI management plan is completed. We discussed low calorie, low carb based diet program, portion control and increasing exercise..    Follow Up   Return in about 2 weeks (around 5/4/2022), or xray today.  Patient was given instructions and counseling regarding her condition or for health maintenance advice. Please see specific information pulled into the AVS if appropriate.

## 2022-04-21 PROCEDURE — 93000 ELECTROCARDIOGRAM COMPLETE: CPT | Performed by: FAMILY MEDICINE

## 2022-04-29 ENCOUNTER — PATIENT ROUNDING (BHMG ONLY) (OUTPATIENT)
Dept: FAMILY MEDICINE CLINIC | Facility: CLINIC | Age: 32
End: 2022-04-29

## 2022-04-29 NOTE — PROGRESS NOTES
April 29, 2022    Hello, may I speak with Urvashi Rinaldi?    My name is LUCIANA MORALES    I am  with [facility]  [facility]   [address] [place] [address] [phone].    Before we get started may I verify your date of birth? 1990    I am calling to officially welcome you to our practice and ask about your recent visit. Is this a good time to talk?  YES    Tell me about your visit with us. What things went well?  MY ENTIRE VISIT WENT WELL.     We're always looking for ways to make our patients' experiences even better. Do you have recommendations on ways we may improve?   NO, EVERYTHING WAS FINE.    Overall were you satisfied with your first visit to our practice?  YES     I appreciate you taking the time to speak with me today. Is there anything else I can do for you?   NO    Thank you, and have a great day.

## 2022-05-17 ENCOUNTER — OFFICE VISIT (OUTPATIENT)
Dept: ENDOCRINOLOGY | Facility: CLINIC | Age: 32
End: 2022-05-17

## 2022-05-17 VITALS
SYSTOLIC BLOOD PRESSURE: 138 MMHG | DIASTOLIC BLOOD PRESSURE: 80 MMHG | HEIGHT: 66 IN | OXYGEN SATURATION: 98 % | WEIGHT: 198.6 LBS | HEART RATE: 115 BPM | BODY MASS INDEX: 31.92 KG/M2

## 2022-05-17 DIAGNOSIS — E06.9 THYROIDITIS: Primary | ICD-10-CM

## 2022-05-17 PROCEDURE — 99213 OFFICE O/P EST LOW 20 MIN: CPT | Performed by: NURSE PRACTITIONER

## 2022-05-17 NOTE — ASSESSMENT & PLAN NOTE
Based on her most recent labs, her thyroid was in range.  She continues to have mild tremor as well as tachycardia.  She is working with PCP to manage her anxiety.  She is also currently trying to conceive pregnancy.  Discussed that if she is pregnant that she will need close monitoring of thyroid during pregnancy.  Follow-up in 2 months.

## 2022-05-17 NOTE — PROGRESS NOTES
Chief Complaint   Patient presents with   • Thyroid Problem        Referring Provider  No ref. provider found     Thyroid Problem  Symptoms include anxiety, fatigue, palpitations and tremors. Patient reports no cold intolerance, heat intolerance, weight gain or weight loss.      Urvashi Rinaldi is a 31 y.o. female had concerns including Thyroid Problem.     History:  She was noted to have an enlarged thyroid at her GYN appt.  They did an ultrasound on 01/21/2022 and noted a 1 cm primarily cystic nodule in the right lobe of the thyroid most likely representing a colloid cyst. Left lobe is heterogeneous and shows a 5 mm solid nodule.  IMPRESSION:  Nodules bilaterally with the largest nodule being cystic in  the right lobe and likely representing a colloid cyst.      She had an ectopic pregnancy in Nov 2021, she had a methotrexate shot at this time.  In 2020 she was noted to have hyperthyroidism around 35+ weeks but gave birth prior to being evaluated.    Most recent TFT's:  12/28/2021:  TSH: 0.518 (0.270-4.200)  Free T4: 1.13 (0.93-1.70)    Birth state: [place]  Previous history of radiation to face/neck: none  Consuming foods high in iodine: none  Family history of thyroid complications: none    She is currently trying to conceive pregnancy.      The following portions of the patient's history were reviewed and updated as appropriate: allergies, current medications, past family history, past medical history, past social history, past surgical history and problem list.    Past Medical History:   Diagnosis Date   • Hyperthyroidism    • Urinary tract infection      Past Surgical History:   Procedure Laterality Date   • LAPAROSCOPIC EXPLORATION FOR ECTOPIC PREGNANCY Left 11/16/2021    Procedure: LAPAROSCOPIC EXPLORATION FOR ECTOPIC PREGNANCY with Left Salpingostomy;  Surgeon: Karel Salinas DO;  Location: [facility];  Service: Obstetrics/Gynecology;  Laterality: Left;   • WISDOM TOOTH EXTRACTION  2014       Family History   Problem Relation Age of Onset   • Hypertension Father    • Heart valve disorder Father    • Hypertension Paternal Grandmother       Social History     Socioeconomic History   • Marital status:    Tobacco Use   • Smoking status: Never Smoker   • Smokeless tobacco: Never Used   Vaping Use   • Vaping Use: Never used   Substance and Sexual Activity   • Alcohol use: Not Currently   • Drug use: Never   • Sexual activity: Defer      No Known Allergies   Current Outpatient Medications on File Prior to Visit   Medication Sig Dispense Refill   • Prenatal Vit-Fe Fumarate-FA (Prenatal Vitamin) 27-0.8 MG tablet Take  by mouth.     • busPIRone (BUSPAR) 30 MG tablet Take 1 tablet by mouth 2 (Two) Times a Day for 30 days. 60 tablet 2   • propranolol (INDERAL) 10 MG tablet Take 1 tablet by mouth 3 (Three) Times a Day As Needed for tremors and palpitations. 90 tablet 2   • [DISCONTINUED] busPIRone (BUSPAR) 10 MG tablet Take 1 tablet by mouth 2 (Two) Times a Day. 60 tablet 6   • [DISCONTINUED] busPIRone (BUSPAR) 30 MG tablet Take 1 tablet by mouth 2 (Two) Times a Day. 60 tablet 2   • [DISCONTINUED] cetirizine (zyrTEC) 10 MG tablet Take 1 tablet by mouth Daily. 30 tablet 2   • [DISCONTINUED] cetirizine (zyrTEC) 10 MG tablet Take 1 tablet by mouth Daily. 30 tablet 2   • [DISCONTINUED] ketorolac (TORADOL) 10 MG tablet Take 1 tablet by mouth 3 (Three) Times a Day 8 hours apart as needed for pain for up to 5 days. 15 tablet 1   • [DISCONTINUED] Progesterone (PROMETRIUM) 200 MG capsule Insert 1 capsule into the vagina every night at bedtime. 30 capsule 0   • [DISCONTINUED] propranolol (INDERAL) 10 MG tablet Take 1 tablet by mouth 3 (Three) Times a Day for 30 days. As needed for tremors and palpitations 90 tablet 2     No current facility-administered medications on file prior to visit.      Review of Systems   Constitutional: Positive for fatigue. Negative for unexpected weight gain and unexpected weight loss.  "  Eyes: Negative.    Cardiovascular: Positive for palpitations.   Gastrointestinal: Negative.    Endocrine: Negative for cold intolerance and heat intolerance.   Neurological: Positive for tremors.   Psychiatric/Behavioral: Positive for agitation and sleep disturbance. The patient is nervous/anxious.    All other systems reviewed and are negative.     /80 (BP Location: Right arm, Patient Position: Sitting, Cuff Size: Adult)   Pulse 115   Ht 167.6 cm (66\")   Wt 90.1 kg (198 lb 9.6 oz)   SpO2 98%   BMI 32.05 kg/m²      Physical Exam  Vitals reviewed.   Constitutional:       Appearance: Normal appearance.   Eyes:      Extraocular Movements: Extraocular movements intact.   Neck:      Comments: Moderate overall enlargement of the thyroid gland with tenderness. No palpable nodules.  Cardiovascular:      Rate and Rhythm: Regular rhythm. Tachycardia present.      Pulses: Normal pulses.      Heart sounds: Normal heart sounds.   Pulmonary:      Effort: Pulmonary effort is normal.      Breath sounds: Normal breath sounds.   Skin:     General: Skin is warm.   Neurological:      Mental Status: She is alert and oriented to person, place, and time.   Psychiatric:         Mood and Affect: Mood normal.         Behavior: Behavior normal.         Thought Content: Thought content normal.         Judgment: Judgment normal.       Labs/Imaging  CMP  Lab Results   Component Value Date    GLUCOSE 145 (H) 11/16/2021    BUN 13 11/16/2021    CREATININE 0.66 11/16/2021    EGFRIFNONA 106 11/16/2021    BCR 19.7 11/16/2021    K 3.7 11/16/2021    CO2 20.0 (L) 11/16/2021    CALCIUM 8.8 11/16/2021    ALBUMIN 4.50 11/13/2021    AST 18 11/13/2021    ALT 33 11/13/2021        CBC w/DIFF   Lab Results   Component Value Date    WBC 8.86 11/16/2021    RBC 4.51 11/16/2021    HGB 12.1 11/16/2021    HCT 38.3 11/16/2021    MCV 84.9 11/16/2021    MCH 26.8 11/16/2021    MCHC 31.6 11/16/2021    RDW 14.6 11/16/2021    RDWSD 44.9 11/16/2021    MPV 9.4 " 11/16/2021     11/16/2021    NEUTRORELPCT 65.5 11/16/2021    LYMPHORELPCT 27.3 11/16/2021    MONORELPCT 4.9 (L) 11/16/2021    EOSRELPCT 1.2 11/16/2021    BASORELPCT 0.5 11/16/2021    AUTOIGPER 0.6 (H) 11/16/2021    NEUTROABS 5.81 11/16/2021    LYMPHSABS 2.42 11/16/2021    MONOSABS 0.43 11/16/2021    EOSABS 0.11 11/16/2021    BASOSABS 0.04 11/16/2021    AUTOIGNUM 0.05 11/16/2021    NRBC 0.0 11/16/2021       TSH  Lab Results   Component Value Date    TSH 0.443 04/13/2022    TSH 0.388 02/15/2022       T4  Lab Results   Component Value Date    FREET4 1.23 02/15/2022     No results found for: H4QYIRY    T3  Lab Results   Component Value Date    T3FREE 3.37 02/15/2022     No results found for: R9WBDMA    TRAb  No results found for: TSURCPAB    TPO  No results found for: THYROIDAB    No valid procedures specified.    Assessment and Plan    Diagnoses and all orders for this visit:    1. Thyroiditis (Primary)  Assessment & Plan:  Based on her most recent labs, her thyroid was in range.  She continues to have mild tremor as well as tachycardia.  She is working with PCP to manage her anxiety.  She is also currently trying to conceive pregnancy.  Discussed that if she is pregnant that she will need close monitoring of thyroid during pregnancy.  Follow-up in 2 months.    Orders:  -     US Thyroid; Future       Return in about 2 months (around 7/17/2022) for Follow-up appointment. The patient was instructed to contact the clinic with any interval questions or concerns.    This document has been electronically signed by SHANICE Santamaria  May 17, 2022 14:38 EDT  Endocrinology

## 2022-05-23 ENCOUNTER — OFFICE VISIT (OUTPATIENT)
Dept: FAMILY MEDICINE CLINIC | Facility: CLINIC | Age: 32
End: 2022-05-23

## 2022-05-23 VITALS
OXYGEN SATURATION: 99 % | BODY MASS INDEX: 31.98 KG/M2 | HEIGHT: 66 IN | HEART RATE: 111 BPM | SYSTOLIC BLOOD PRESSURE: 130 MMHG | DIASTOLIC BLOOD PRESSURE: 80 MMHG | WEIGHT: 199 LBS | TEMPERATURE: 96.9 F

## 2022-05-23 DIAGNOSIS — L30.9 DERMATITIS: ICD-10-CM

## 2022-05-23 DIAGNOSIS — R00.0 TACHYCARDIA: Primary | ICD-10-CM

## 2022-05-23 DIAGNOSIS — F41.9 ANXIETY: ICD-10-CM

## 2022-05-23 DIAGNOSIS — Z91.09 ENVIRONMENTAL ALLERGIES: ICD-10-CM

## 2022-05-23 PROCEDURE — 99214 OFFICE O/P EST MOD 30 MIN: CPT | Performed by: FAMILY MEDICINE

## 2022-05-23 RX ORDER — BUSPIRONE HYDROCHLORIDE 30 MG/1
15 TABLET ORAL 2 TIMES DAILY
Qty: 60 TABLET | Refills: 2 | Status: SHIPPED | OUTPATIENT
Start: 2022-05-23 | End: 2022-11-11 | Stop reason: SDUPTHER

## 2022-05-23 RX ORDER — LORATADINE 10 MG/1
10 TABLET ORAL DAILY
Qty: 30 TABLET | Refills: 2 | Status: SHIPPED | OUTPATIENT
Start: 2022-05-23 | End: 2022-11-11 | Stop reason: SDUPTHER

## 2022-05-24 NOTE — PROGRESS NOTES
"Chief Complaint  Shortness of Breath (F/u )    Subjective          Urvashi Rinaldi presents to [facility]  Shortness of Breath  This is a new problem. The problem has been resolved. Associated symptoms include a rash and sputum production. Pertinent negatives include no orthopnea. Associated symptoms comments: States she quit taking the propranolol and her shortness of breath resolved. States she is not just having some sputum when she gets up in the mornings. States she is still having a elevated heart rate and endocrinology did not think it was from the thyroiditis. Will send to cardiology for further work up. . She has tried rest for the symptoms. The treatment provided moderate relief.   Rash  This is a recurrent problem. The current episode started more than 1 month ago. The affected locations include the right hand and right fingers. The rash is characterized by dryness and redness. She was exposed to nothing. Associated symptoms include shortness of breath. Past treatments include moisturizer. The treatment provided mild relief.   Anxiety  Presents for follow-up visit. Symptoms include nervous/anxious behavior and shortness of breath. Symptoms occur most days. The severity of symptoms is moderate.     Compliance with medications is %.       Review of Systems   Respiratory: Positive for sputum production and shortness of breath.    Cardiovascular: Negative for orthopnea.   Skin: Positive for rash.   Psychiatric/Behavioral: The patient is nervous/anxious.          Objective   Vital Signs:   /80 (BP Location: Right arm, Patient Position: Sitting, Cuff Size: Adult)   Pulse 111   Temp 96.9 °F (36.1 °C) (Temporal)   Ht 167.6 cm (66\")   Wt 90.3 kg (199 lb)   SpO2 99%   BMI 32.12 kg/m²     Physical Exam  Constitutional:       General: She is not in acute distress.     Appearance: Normal appearance. She is well-developed and well-groomed. She is not " ill-appearing, toxic-appearing or diaphoretic.   HENT:      Head: Normocephalic.      Nose: Nose normal. No congestion or rhinorrhea.      Mouth/Throat:      Mouth: Mucous membranes are moist.      Pharynx: Oropharynx is clear. No oropharyngeal exudate or posterior oropharyngeal erythema.   Eyes:      General: Lids are normal.         Right eye: No discharge.         Left eye: No discharge.      Extraocular Movements: Extraocular movements intact.      Pupils: Pupils are equal, round, and reactive to light.   Neck:      Vascular: No carotid bruit.   Cardiovascular:      Rate and Rhythm: Normal rate and regular rhythm.      Pulses: Normal pulses.      Heart sounds: Normal heart sounds. No murmur heard.    No friction rub. No gallop.   Pulmonary:      Effort: Pulmonary effort is normal. No respiratory distress.      Breath sounds: Normal breath sounds. No stridor. No wheezing, rhonchi or rales.   Chest:      Chest wall: No tenderness.   Abdominal:      General: Bowel sounds are normal. There is no distension.      Palpations: Abdomen is soft. There is no mass.      Tenderness: There is no abdominal tenderness. There is no right CVA tenderness, left CVA tenderness, guarding or rebound.      Hernia: No hernia is present.   Musculoskeletal:         General: No swelling or tenderness. Normal range of motion.      Cervical back: Normal range of motion and neck supple. No rigidity or tenderness.      Right lower leg: No edema.      Left lower leg: No edema.   Lymphadenopathy:      Cervical: No cervical adenopathy.   Skin:     General: Skin is warm.      Capillary Refill: Capillary refill takes less than 2 seconds.      Coloration: Skin is not jaundiced.      Findings: No bruising, erythema or rash.   Neurological:      General: No focal deficit present.      Mental Status: She is alert and oriented to person, place, and time.      Motor: Motor function is intact. No weakness.      Coordination: Coordination is intact.       Gait: Gait is intact. Gait normal.   Psychiatric:         Attention and Perception: Attention normal.         Mood and Affect: Mood normal.         Speech: Speech normal.         Behavior: Behavior normal.         Cognition and Memory: Cognition normal.         Judgment: Judgment normal.        Result Review :                 Assessment and Plan    Diagnoses and all orders for this visit:    1. Tachycardia (Primary)  -     Ambulatory Referral to Cardiology    2. Anxiety  -     busPIRone (BUSPAR) 30 MG tablet; Take 1/2 tablets by mouth 2 (Two) Times a Day  Dispense: 60 tablet; Refill: 2    3. Dermatitis  -     triamcinolone (KENALOG) 0.1 % ointment; Apply 1 application topically to the appropriate area as directed 2 (Two) Times a Day.  Dispense: 15 g; Refill: 0    4. Environmental allergies  -     loratadine (Claritin) 10 MG tablet; Take 1 tablet by mouth Daily  Dispense: 30 tablet; Refill: 2      Patient's Body mass index is 32.12 kg/m². indicating that she is obese (BMI >30). Obesity-related health conditions include the following: none. Obesity is unchanged. BMI is is above average; BMI management plan is completed. We discussed low calorie, low carb based diet program, portion control and increasing exercise..    Follow Up   Return in about 3 months (around 8/23/2022), or if symptoms worsen or fail to improve.  Patient was given instructions and counseling regarding her condition or for health maintenance advice. Please see specific information pulled into the AVS if appropriate.

## 2022-05-25 ENCOUNTER — OFFICE VISIT (OUTPATIENT)
Dept: CARDIOLOGY | Facility: CLINIC | Age: 32
End: 2022-05-25

## 2022-05-25 VITALS
HEIGHT: 66 IN | HEART RATE: 99 BPM | DIASTOLIC BLOOD PRESSURE: 81 MMHG | BODY MASS INDEX: 31.95 KG/M2 | SYSTOLIC BLOOD PRESSURE: 123 MMHG | WEIGHT: 198.8 LBS

## 2022-05-25 DIAGNOSIS — R00.2 PALPITATIONS: Primary | ICD-10-CM

## 2022-05-25 DIAGNOSIS — I31.39 PERICARDIAL EFFUSION: ICD-10-CM

## 2022-05-25 PROCEDURE — 93000 ELECTROCARDIOGRAM COMPLETE: CPT | Performed by: INTERNAL MEDICINE

## 2022-05-25 PROCEDURE — 99214 OFFICE O/P EST MOD 30 MIN: CPT | Performed by: INTERNAL MEDICINE

## 2022-05-25 NOTE — PROGRESS NOTES
Suki Coy APRN  Urvashi Rinaldi  1990 05/25/2022    Patient Active Problem List   Diagnosis   • Preeclampsia in postpartum period   • Postpartum hypertension   • Left tubal pregnancy without intrauterine pregnancy   • Thyroiditis       Dear Suki Coy APRN:    Subjective     Urvashi Rinaldi is a 31 y.o. female with the problems as listed above, presents    Chief complaint: Intermittent rapid heartbeat.    History of Present Illness: Yvette is a pleasant 31-year-old  female with diagnosis of thyroiditis according to her endocrinologist, was noted to have some intermittent rapid heart rate.  She was tried on propranolol but apparently could not tolerate this due to shortness of breath.  She is now referred to us for the same.  On further questioning she states that her heart rate sometimes gets up to 140 bpm.  She denies any associated dizziness or syncope.  She has some intermittent palpitations.  She has no documented cardiac arrhythmias.  She had an episode of mild heart failure in March 2020 postpartum period.  Her echo Doppler study at that time revealed normal LV chamber size, wall motion and systolic function and normal valvular function..  Small pericardial effusion at the time.  She states her breathing has improved since then.  Her recent chest x-ray on 4/20/2022 revealed no evidence of heart failure.  She denies any chest pains.  She denies any significant dyspnea, PND, orthopnea or pedal edema.          No Known Allergies:      Current Outpatient Medications:   •  busPIRone (BUSPAR) 30 MG tablet, Take 1/2 tablets by mouth 2 (Two) Times a Day, Disp: 60 tablet, Rfl: 2  •  Prenatal Vit-Fe Fumarate-FA (Prenatal Vitamin) 27-0.8 MG tablet, Take  by mouth., Disp: , Rfl:   •  loratadine (Claritin) 10 MG tablet, Take 1 tablet by mouth Daily, Disp: 30 tablet, Rfl: 2  •  triamcinolone (KENALOG) 0.1 % ointment, Apply 1 application topically to the appropriate area as directed  "2 (Two) Times a Day., Disp: 15 g, Rfl: 0    Past Medical History:   Diagnosis Date   • Hyperthyroidism    • Urinary tract infection      Past Surgical History:   Procedure Laterality Date   • LAPAROSCOPIC EXPLORATION FOR ECTOPIC PREGNANCY Left 11/16/2021    Procedure: LAPAROSCOPIC EXPLORATION FOR ECTOPIC PREGNANCY with Left Salpingostomy;  Surgeon: Karel Salinas DO;  Location: [facility];  Service: Obstetrics/Gynecology;  Laterality: Left;   • WISDOM TOOTH EXTRACTION  2014     Family History   Problem Relation Age of Onset   • Heart disease Father    • Hypertension Father    • Heart valve disorder Father    • Hypertension Paternal Grandmother      Social History     Tobacco Use   • Smoking status: Never Smoker   • Smokeless tobacco: Never Used   Vaping Use   • Vaping Use: Never used   Substance Use Topics   • Alcohol use: Not Currently   • Drug use: Never       Review of Systems   Constitutional: Negative.   HENT: Negative.    Eyes: Negative.    Cardiovascular: Negative.    Respiratory: Negative.    Endocrine: Negative.    Hematologic/Lymphatic: Negative.    Skin: Positive for rash.   Musculoskeletal: Negative.    Gastrointestinal: Negative.    Genitourinary: Negative.    Neurological: Negative.    Psychiatric/Behavioral: Negative.        Objective   Blood pressure 123/81, pulse 99, height 167.6 cm (66\"), weight 90.2 kg (198 lb 12.8 oz), not currently breastfeeding.  Body mass index is 32.09 kg/m².    Vitals reviewed.   Constitutional:       Appearance: Well-developed.   Eyes:      Conjunctiva/sclera: Conjunctivae normal.   HENT:      Head: Normocephalic.   Neck:      Thyroid: No thyromegaly.      Vascular: No JVD.      Trachea: No tracheal deviation.   Pulmonary:      Effort: No respiratory distress.      Breath sounds: Normal breath sounds. No wheezing. No rales.   Cardiovascular:      PMI at left midclavicular line. Normal rate. Regular rhythm. Normal S1. Normal S2.      Murmurs: There is no murmur.      " No gallop. No click. No rub.   Pulses:     Intact distal pulses.   Edema:     Peripheral edema absent.   Abdominal:      General: Bowel sounds are normal.      Palpations: Abdomen is soft. There is no abdominal mass.      Tenderness: There is no abdominal tenderness.   Musculoskeletal:      Cervical back: Normal range of motion and neck supple. Skin:     General: Skin is warm and dry.   Neurological:      Mental Status: Alert and oriented to person, place, and time.      Cranial Nerves: No cranial nerve deficit.       Lab Results   Component Value Date    TSH 0.443 04/13/2022       ECG 12 Lead    Date/Time: 5/25/2022 10:00 AM  Performed by: Bill Chacon MD  Authorized by: Bill Chacon MD   Comparison: compared with previous ECG from 4/20/2022  Similar to previous ECG  Rhythm: sinus rhythm  BPM: 96  Conduction: conduction normal  ST Segments: ST segments normal  T Waves: T waves normal              Assessment & Plan :   Diagnosis Plan   1. Palpitations  Cardiac Event Monitor    Adult Transthoracic Echo Complete    2. Pericardial effusion  Adult Transthoracic Echo Complete       Recommendations:  Orders Placed This Encounter   Procedures   • Cardiac Event Monitor   • ECG 12 Lead   • Adult Transthoracic Echo Complete w/ Color, Spectral and Contrast if necessary per protocol      1. We will evaluate her palpitations with an event monitor.  2. we will reevaluate her pericardial effusion and LV function with an echo Doppler study.      Return in about 6 weeks (around 7/6/2022).    As always, I appreciate very much the opportunity to participate in the cardiovascular care of your patients.      With Best Regards,    Bill Chacon MD, [facility]    Dragon disclaimer:  Much of this encounter note is an electronic transcription/translation of spoken language to printed text. The electronic translation of spoken language may permit erroneous, or at times, nonsensical words or phrases to be inadvertently transcribed;  Although I have reviewed the note for such errors, some may still exist.

## 2022-05-26 ENCOUNTER — TELEPHONE (OUTPATIENT)
Dept: CARDIOLOGY | Facility: CLINIC | Age: 32
End: 2022-05-26

## 2022-05-26 ENCOUNTER — PATIENT ROUNDING (BHMG ONLY) (OUTPATIENT)
Dept: CARDIOLOGY | Facility: CLINIC | Age: 32
End: 2022-05-26

## 2022-05-26 NOTE — TELEPHONE ENCOUNTER
Can you please get her sensitive skin patches? The monitor is to evaluate her palpitations and rapid heart beat, to see if she has any cardiac arrhythmias causing this.

## 2022-05-26 NOTE — PROGRESS NOTES
Ana    My name is Romelia    I am  with [facility]  [facility]  [address] [place] [address] [phone].    I am calling to officially welcome you to our practice and ask about your visit. If there is anything at all we can do for you please let us know. We're always looking for ways to make our patients' experiences even better. If you have any recommendations on ways we may improve please don't hesitate to call.     Thank you so much and have a marvelous day.

## 2022-05-26 NOTE — TELEPHONE ENCOUNTER
Patient wants to know why she has to wear event monitor. Said it is burning her skin and she don't think she can wear it and don't think its the adhesive.     Thanks!

## 2022-05-31 ENCOUNTER — TELEPHONE (OUTPATIENT)
Dept: CARDIOLOGY | Facility: CLINIC | Age: 32
End: 2022-05-31

## 2022-05-31 NOTE — TELEPHONE ENCOUNTER
Patient called and said she needs to quit wearing the event monitor. She said it is giving her really bad anxiety attacks and causing her skin to break out. She said she is going to take it off if its ok with provider. She said the insurance said that had to be ok with provider before taking it off for the insurance to cover it.        Patient says she has not had any events since she has been wearing the monitor.       Thanks!

## 2022-06-02 ENCOUNTER — TELEPHONE (OUTPATIENT)
Dept: CARDIOLOGY | Facility: CLINIC | Age: 32
End: 2022-06-02

## 2022-06-02 NOTE — TELEPHONE ENCOUNTER
Patient's  called.  Urvashi was on speaker phone with him and she gave me permission to speak with him.    Yoandy said Urvashi is upset about her cardiac event monitor.  I told him that once [employer] receives the monitor back, they will download the information from the monitor (if there is any) and they will send the readings to Dr. Chacon.  Dr Chacon will then interpret the study.  I also told him I will ask Dr. Chacon to add to his dictation that patient had to discontinue wearing the monitor after 6 days due to skin irritation from the patch.    Yoandy asked what the cost of the monitor will be.  I told him I will call him back with the number to our Expense Department.    I called the Expense Department and spoke with Norma.  Per Norma, the charge for our interpretation fee will be $75.  I then called [employer] and asked what their fee is for the cardiac monitor.  I explained that the patient had to discontinue the monitor due to skin irritation.  [employer] submitted a Cancellation Ticket, [id number], and asked that the charges be voided.   I called Yoandy and gave him the Cancellation Ticket number, and again apologized for Urvashi being upset.  I also told him if he got an EOB from [employer] that showed a charge, to call me and I'll contact [employer] again.

## 2022-06-02 NOTE — TELEPHONE ENCOUNTER
Pt called the office today with questions about her monitor she is wearing. She had to take it off early due to her having an allergic reaction with the patches.   Preeti and I tried to explain to her that insurance will still bill for the amount of time she wore it. Pt hung up on us and Preeti called her back to see when she returned the monitor and she stated yesterday. I advised her that the company was calling her due her taking it off early and they were checking why they were not receiving any readings. She stated she advised them why she took it off and they expressed understanding and advised her that we would need to call them and let them know it was okay for her to take it off early. Pt hung up on us again.

## 2022-06-15 ENCOUNTER — APPOINTMENT (OUTPATIENT)
Dept: ULTRASOUND IMAGING | Facility: HOSPITAL | Age: 32
End: 2022-06-15

## 2022-06-24 ENCOUNTER — APPOINTMENT (OUTPATIENT)
Dept: CARDIOLOGY | Facility: HOSPITAL | Age: 32
End: 2022-06-24

## 2022-06-24 DIAGNOSIS — R00.2 PALPITATIONS: ICD-10-CM

## 2022-07-19 ENCOUNTER — TELEPHONE (OUTPATIENT)
Dept: ENDOCRINOLOGY | Facility: CLINIC | Age: 32
End: 2022-07-19

## 2022-07-19 NOTE — TELEPHONE ENCOUNTER
She will need to be scheduled monthly for appts for monitoring and frequent medication adjustments.  Labs can be done at her appts.

## 2022-07-19 NOTE — TELEPHONE ENCOUNTER
Pt called because she had to reschd her appt. She did want to let us know that she recently found out she was pregnant. [facility]. Pt was unsure if she would need lab work or anything as we advise her to call us when she found out she was pregnant.

## 2022-07-21 ENCOUNTER — HOSPITAL ENCOUNTER (OUTPATIENT)
Dept: ULTRASOUND IMAGING | Facility: HOSPITAL | Age: 32
Discharge: HOME | End: 2022-07-21
Admitting: NURSE PRACTITIONER

## 2022-07-21 DIAGNOSIS — E06.9 THYROIDITIS: ICD-10-CM

## 2022-07-21 PROCEDURE — 76536 US EXAM OF HEAD AND NECK: CPT

## 2022-07-21 PROCEDURE — 76536 US EXAM OF HEAD AND NECK: CPT | Performed by: RADIOLOGY

## 2022-08-09 ENCOUNTER — LAB (OUTPATIENT)
Dept: LAB | Facility: HOSPITAL | Age: 32
End: 2022-08-09

## 2022-08-09 ENCOUNTER — OFFICE VISIT (OUTPATIENT)
Dept: ENDOCRINOLOGY | Facility: CLINIC | Age: 32
End: 2022-08-09

## 2022-08-09 VITALS
HEIGHT: 66 IN | HEART RATE: 121 BPM | SYSTOLIC BLOOD PRESSURE: 130 MMHG | BODY MASS INDEX: 34.23 KG/M2 | DIASTOLIC BLOOD PRESSURE: 78 MMHG | WEIGHT: 213 LBS | OXYGEN SATURATION: 98 %

## 2022-08-09 DIAGNOSIS — E06.9 THYROIDITIS: Primary | ICD-10-CM

## 2022-08-09 LAB
T3FREE SERPL-MCNC: 3.84 PG/ML (ref 2–4.4)
T4 FREE SERPL-MCNC: 1.35 NG/DL (ref 0.93–1.7)
TSH SERPL DL<=0.05 MIU/L-ACNC: 0.39 UIU/ML (ref 0.27–4.2)

## 2022-08-09 PROCEDURE — 84443 ASSAY THYROID STIM HORMONE: CPT | Performed by: NURSE PRACTITIONER

## 2022-08-09 PROCEDURE — 36415 COLL VENOUS BLD VENIPUNCTURE: CPT | Performed by: NURSE PRACTITIONER

## 2022-08-09 PROCEDURE — 99213 OFFICE O/P EST LOW 20 MIN: CPT | Performed by: NURSE PRACTITIONER

## 2022-08-09 PROCEDURE — 84481 FREE ASSAY (FT-3): CPT | Performed by: NURSE PRACTITIONER

## 2022-08-09 PROCEDURE — 84439 ASSAY OF FREE THYROXINE: CPT | Performed by: NURSE PRACTITIONER

## 2022-08-09 NOTE — ASSESSMENT & PLAN NOTE
- Clinically euthyroid.  -TFTs today with medication management as indicated.  -Follow-up in 6 months for repeat ultrasound or as needed based on labs from OB throughout pregnancy.

## 2022-08-09 NOTE — PROGRESS NOTES
No chief complaint on file.       Urvashi Rinaldi is a 32 y.o. female had no chief complaint listed for this encounter.     She is currently 7 weeks pregnant.  She is being followed by OB.  Her most recent ultrasound, 7/2022, showed a decrease in the size of the cyst but and increase in the size of the solid nodule.    History:  She was noted to have an enlarged thyroid at her GYN appt.  They did an ultrasound on 01/21/2022 and noted a 1 cm primarily cystic nodule in the right lobe of the thyroid most likely representing a colloid cyst. Left lobe is heterogeneous and shows a 5 mm solid nodule.  IMPRESSION:  Nodules bilaterally with the largest nodule being cystic in  the right lobe and likely representing a colloid cyst.      She had an ectopic pregnancy in Nov 2021, she had a methotrexate shot at this time.  In 2020 she was noted to have hyperthyroidism around 35+ weeks but gave birth prior to being evaluated.    Most recent TFT's:  12/28/2021:  TSH: 0.518 (0.270-4.200)  Free T4: 1.13 (0.93-1.70)    Birth state: [place]  Previous history of radiation to face/neck: none  Consuming foods high in iodine: none  Family history of thyroid complications: none    The following portions of the patient's history were reviewed and updated as appropriate: allergies, current medications, past family history, past medical history, past social history, past surgical history and problem list.    Past Medical History:   Diagnosis Date   • Hyperthyroidism    • Pregnancy    • Urinary tract infection      Past Surgical History:   Procedure Laterality Date   • LAPAROSCOPIC EXPLORATION FOR ECTOPIC PREGNANCY Left 11/16/2021    Procedure: LAPAROSCOPIC EXPLORATION FOR ECTOPIC PREGNANCY with Left Salpingostomy;  Surgeon: Karel Salinas DO;  Location: [facility];  Service: Obstetrics/Gynecology;  Laterality: Left;   • WISDOM TOOTH EXTRACTION  2014      Family History   Problem Relation Age of Onset   • Heart disease Father    •  "Hypertension Father    • Heart valve disorder Father    • Hypertension Paternal Grandmother       Social History     Socioeconomic History   • Marital status:    Tobacco Use   • Smoking status: Never Smoker   • Smokeless tobacco: Never Used   Vaping Use   • Vaping Use: Never used   Substance and Sexual Activity   • Alcohol use: Not Currently   • Drug use: Never   • Sexual activity: Defer      No Known Allergies   Current Outpatient Medications on File Prior to Visit   Medication Sig Dispense Refill   • Prenatal Vit-Fe Fumarate-FA (Prenatal Vitamin) 27-0.8 MG tablet Take  by mouth.     • busPIRone (BUSPAR) 30 MG tablet Take 1/2 tablets by mouth 2 (Two) Times a Day 60 tablet 2   • loratadine (Claritin) 10 MG tablet Take 1 tablet by mouth Daily 30 tablet 2   • triamcinolone (KENALOG) 0.1 % ointment Apply 1 application topically to the appropriate area as directed 2 (Two) Times a Day. 15 g 0     No current facility-administered medications on file prior to visit.      Review of Systems   Constitutional: Positive for fatigue. Negative for unexpected weight gain and unexpected weight loss.   Eyes: Negative.    Cardiovascular: Positive for palpitations.   Gastrointestinal: Negative.    Endocrine: Negative for cold intolerance and heat intolerance.   Neurological: Positive for tremors.   Psychiatric/Behavioral: Positive for agitation and sleep disturbance. The patient is nervous/anxious.    All other systems reviewed and are negative.     /78 (BP Location: Left arm, Patient Position: Sitting, Cuff Size: Adult)   Pulse (!) 121   Ht 167.6 cm (66\")   Wt 96.6 kg (213 lb)   LMP  (LMP Unknown)   SpO2 98%   BMI 34.38 kg/m²      Physical Exam  Vitals reviewed.   Constitutional:       Appearance: Normal appearance.   Eyes:      Extraocular Movements: Extraocular movements intact.   Neck:      Comments: Moderate overall enlargement of the thyroid gland with tenderness. No palpable nodules.  Cardiovascular:      " Rate and Rhythm: Regular rhythm. Tachycardia present.      Pulses: Normal pulses.      Heart sounds: Normal heart sounds.   Pulmonary:      Effort: Pulmonary effort is normal.      Breath sounds: Normal breath sounds.   Skin:     General: Skin is warm.   Neurological:      Mental Status: She is alert and oriented to person, place, and time.   Psychiatric:         Mood and Affect: Mood normal.         Behavior: Behavior normal.         Thought Content: Thought content normal.         Judgment: Judgment normal.       Labs/Imaging  CMP  Lab Results   Component Value Date    GLUCOSE 145 (H) 11/16/2021    BUN 13 11/16/2021    CREATININE 0.66 11/16/2021    EGFRIFNONA 106 11/16/2021    BCR 19.7 11/16/2021    K 3.7 11/16/2021    CO2 20.0 (L) 11/16/2021    CALCIUM 8.8 11/16/2021    ALBUMIN 4.50 11/13/2021    AST 18 11/13/2021    ALT 33 11/13/2021        CBC w/DIFF   Lab Results   Component Value Date    WBC 8.86 11/16/2021    RBC 4.51 11/16/2021    HGB 12.1 11/16/2021    HCT 38.3 11/16/2021    MCV 84.9 11/16/2021    MCH 26.8 11/16/2021    MCHC 31.6 11/16/2021    RDW 14.6 11/16/2021    RDWSD 44.9 11/16/2021    MPV 9.4 11/16/2021     11/16/2021    NEUTRORELPCT 65.5 11/16/2021    LYMPHORELPCT 27.3 11/16/2021    MONORELPCT 4.9 (L) 11/16/2021    EOSRELPCT 1.2 11/16/2021    BASORELPCT 0.5 11/16/2021    AUTOIGPER 0.6 (H) 11/16/2021    NEUTROABS 5.81 11/16/2021    LYMPHSABS 2.42 11/16/2021    MONOSABS 0.43 11/16/2021    EOSABS 0.11 11/16/2021    BASOSABS 0.04 11/16/2021    AUTOIGNUM 0.05 11/16/2021    NRBC 0.0 11/16/2021       TSH  Lab Results   Component Value Date    TSH 0.443 04/13/2022    TSH 0.388 02/15/2022       T4  Lab Results   Component Value Date    FREET4 1.23 02/15/2022     No results found for: S1XQXCZ    T3  Lab Results   Component Value Date    T3FREE 3.37 02/15/2022     No results found for: [id number]    TRAb  No results found for: TSURCPAB    TPO  No results found for: THYROIDAB    No valid procedures  specified.    Assessment and Plan    Diagnoses and all orders for this visit:    1. Thyroiditis (Primary)  Assessment & Plan:  - Clinically euthyroid.  -TFTs today with medication management as indicated.  -Follow-up in 6 months for repeat ultrasound or as needed based on labs from OB throughout pregnancy.      Orders:  -     TSH  -     T4, Free  -     T3, Free       Return in about 6 months (around 2/9/2023) for Follow-up appointment. The patient was instructed to contact the clinic with any interval questions or concerns.    This document has been electronically signed by SHANICE Santamaria  August 9, 2022 13:39 EDT  Endocrinology    Please note that portions of this document were completed with a voice recognition program. Efforts were made to edit the dictations, but occasionally words are mis-transcribed.

## 2022-11-11 ENCOUNTER — OFFICE VISIT (OUTPATIENT)
Dept: FAMILY MEDICINE CLINIC | Facility: CLINIC | Age: 32
End: 2022-11-11

## 2022-11-11 VITALS
HEART RATE: 114 BPM | OXYGEN SATURATION: 99 % | BODY MASS INDEX: 34.38 KG/M2 | HEIGHT: 66 IN | TEMPERATURE: 96.9 F | RESPIRATION RATE: 16 BRPM

## 2022-11-11 DIAGNOSIS — H66.003 NON-RECURRENT ACUTE SUPPURATIVE OTITIS MEDIA OF BOTH EARS WITHOUT SPONTANEOUS RUPTURE OF TYMPANIC MEMBRANES: ICD-10-CM

## 2022-11-11 DIAGNOSIS — U07.1 COVID-19: Primary | ICD-10-CM

## 2022-11-11 LAB
EXPIRATION DATE: ABNORMAL
EXPIRATION DATE: NORMAL
EXPIRATION DATE: NORMAL
FLUAV AG NPH QL: NEGATIVE
FLUBV AG NPH QL: NEGATIVE
INTERNAL CONTROL: ABNORMAL
INTERNAL CONTROL: NORMAL
INTERNAL CONTROL: NORMAL
Lab: ABNORMAL
Lab: NORMAL
Lab: NORMAL
S PYO AG THROAT QL: NEGATIVE
SARS-COV-2 AG UPPER RESP QL IA.RAPID: DETECTED

## 2022-11-11 PROCEDURE — 99213 OFFICE O/P EST LOW 20 MIN: CPT | Performed by: NURSE PRACTITIONER

## 2022-11-11 PROCEDURE — 87426 SARSCOV CORONAVIRUS AG IA: CPT | Performed by: NURSE PRACTITIONER

## 2022-11-11 PROCEDURE — 87804 INFLUENZA ASSAY W/OPTIC: CPT | Performed by: NURSE PRACTITIONER

## 2022-11-11 PROCEDURE — 87880 STREP A ASSAY W/OPTIC: CPT | Performed by: NURSE PRACTITIONER

## 2022-11-11 RX ORDER — AMOXICILLIN 875 MG/1
875 TABLET, COATED ORAL 2 TIMES DAILY
Qty: 20 TABLET | Refills: 0 | Status: SHIPPED | OUTPATIENT
Start: 2022-11-11 | End: 2022-11-21

## 2022-11-11 RX ORDER — ASPIRIN 81 MG/81MG
1 CAPSULE ORAL DAILY
COMMUNITY
Start: 2022-09-13 | End: 2022-11-11 | Stop reason: SDUPTHER

## 2022-11-11 NOTE — PROGRESS NOTES
Chief Complaint  Nasal Congestion, Fever, Sore Throat, and Headache    Subjective          Urvashi Rinaldi is a 32 y.o. female who presents today to [facility] for initial evaluation     HPI:   History of Present Illness    Presents to clinic for complaint of congestion and sore throat x 5 days.  Reports positive home COVID test but also wants checked for strep.  Reports she is 21 weeks pregnant.  Associated symptoms: Cough and ear pain.  Treatments: Tylenol.  No other issues or concerns at this time.    The following portions of the patient's history were reviewed and updated as appropriate: allergies, current medications, past family history, past medical history, past social history, past surgical history and problem list.    Objective     Allergy:   No Known Allergies     Current Medications:   Current Outpatient Medications   Medication Sig Dispense Refill   • Prenatal Vit-Fe Fumarate-FA ([facility] PRENATAL PO) Take 1 tablet by mouth Daily.     • amoxicillin (AMOXIL) 875 MG tablet Take 1 tablet by mouth 2 (Two) Times a Day for 10 days. 20 tablet 0   • Aspirin ([place]) 81 MG capsule Take 1 capsule by mouth daily. 30 capsule 9     No current facility-administered medications for this visit.       Past Medical History:  Past Medical History:   Diagnosis Date   • Hyperthyroidism    • Pregnancy    • Urinary tract infection        Past Surgical History:  Past Surgical History:   Procedure Laterality Date   • LAPAROSCOPIC EXPLORATION FOR ECTOPIC PREGNANCY Left 11/16/2021    Procedure: LAPAROSCOPIC EXPLORATION FOR ECTOPIC PREGNANCY with Left Salpingostomy;  Surgeon: Karel Salinas DO;  Location: [facility];  Service: Obstetrics/Gynecology;  Laterality: Left;   • WISDOM TOOTH EXTRACTION  2014       Social History:  Social History     Socioeconomic History   • Marital status:    Tobacco Use   • Smoking status: Never   • Smokeless tobacco: Never   Vaping Use   • Vaping  "Use: Never used   Substance and Sexual Activity   • Alcohol use: Not Currently   • Drug use: Never   • Sexual activity: Defer       Family History:  Family History   Problem Relation Age of Onset   • Heart disease Father    • Hypertension Father    • Heart valve disorder Father    • Hypertension Paternal Grandmother        Review of Systems:  Review of Systems   Constitutional: Negative for chills and fever.   Respiratory: Negative for shortness of breath and wheezing.        Vital Signs:   Pulse 114   Temp 96.9 °F (36.1 °C) (Temporal)   Resp 16   Ht 167.6 cm (66\")   SpO2 99%   BMI 34.38 kg/m²      Physical Exam:  Physical Exam  Vitals and nursing note reviewed.   Constitutional:       General: She is not in acute distress.     Appearance: Normal appearance. She is not ill-appearing or toxic-appearing.   HENT:      Head: Normocephalic.      Right Ear: Ear canal and external ear normal. Tenderness present. Tympanic membrane is erythematous.      Left Ear: Ear canal and external ear normal. Tenderness present. Tympanic membrane is erythematous.      Nose: Congestion present.      Mouth/Throat:      Mouth: Mucous membranes are moist.      Pharynx: Oropharynx is clear.      Comments: PND  Eyes:      Conjunctiva/sclera: Conjunctivae normal.   Cardiovascular:      Rate and Rhythm: Regular rhythm. Tachycardia present.      Heart sounds: Normal heart sounds.   Pulmonary:      Effort: Pulmonary effort is normal. No respiratory distress.      Breath sounds: Normal breath sounds.   Lymphadenopathy:      Cervical: No cervical adenopathy.   Skin:     General: Skin is warm and dry.      Coloration: Skin is not pale.   Neurological:      Mental Status: She is alert and oriented to person, place, and time.   Psychiatric:         Mood and Affect: Mood normal.         Behavior: Behavior normal.         Thought Content: Thought content normal.         Judgment: Judgment normal.                  Assessment and Plan   Diagnoses and " all orders for this visit:    1. COVID-19 (Primary)  -     POCT SARS-CoV-2 Antigen DIPESH  -     POCT Influenza A/B  -     POCT rapid strep A    2. Non-recurrent acute suppurative otitis media of both ears without spontaneous rupture of tympanic membranes  -     amoxicillin (AMOXIL) 875 MG tablet; Take 1 tablet by mouth 2 (Two) Times a Day for 10 days.  Dispense: 20 tablet; Refill: 0    1. Positive for COVID-19.  Increase water intake.  Ambulate often.  Cough and deep breathe. Follow-up with your OB today to update on diagnosis.  2.  Regimen as above.    Discussed possible differential diagnoses, testing, treatment, recommended non-pharmacological interventions, risks, warning signs to monitor for that would indicate need for follow-up in clinic or ER. If no improvement with these regimens or you have new or worsening symptoms follow-up. Patient verbalizes understanding and agreement with plan of care. Denies further needs or concerns.     Patient was given instructions and counseling regarding her condition and for health maintenance advised.    BMI is >= 30 and <35. (Class 1 Obesity). The following options were offered after discussion;: weight loss educational material (shared in after visit summary), exercise counseling/recommendations and nutrition counseling/recommendations       I spent 20 minutes caring for patient on this date of service. This time includes time spent by me in the following activities: preparing for the visit, reviewing tests, obtaining and/or reviewing a separately obtained history, performing a medically appropriate examination and/or evaluation, counseling and educating the patient/family/caregiver, ordering medications, tests, or procedures and documenting information in the medical record    Meds ordered during this visit:  New Medications Ordered This Visit   Medications   • amoxicillin (AMOXIL) 875 MG tablet     Sig: Take 1 tablet by mouth 2 (Two) Times a Day for 10 days.     Dispense:   20 tablet     Refill:  0       Patient Instructions:  Patient instructions given for the following visit diagnosis:    ICD-10-CM ICD-9-CM   1. COVID-19  U07.1 079.89   2. Non-recurrent acute suppurative otitis media of both ears without spontaneous rupture of tympanic membranes  H66.003 382.00       Follow Up   Return for Recheck in 3-5 days, sooner if needed. .        This document has been electronically signed by SHANICE Galloway  November 11, 2022 17:31 EST    Patient was given instructions and counseling regarding her condition or for health maintenance advice. Please see specific information pulled into the AVS if appropriate.     Part of this note may be an electronic transcription/translation of spoken language to printed text using the Dragon Dictation System.

## 2022-12-14 DIAGNOSIS — E06.9 THYROIDITIS: Primary | ICD-10-CM

## 2023-01-05 ENCOUNTER — HOSPITAL ENCOUNTER (OUTPATIENT)
Dept: ULTRASOUND IMAGING | Facility: HOSPITAL | Age: 33
Discharge: HOME | End: 2023-01-05
Admitting: NURSE PRACTITIONER
Payer: COMMERCIAL

## 2023-01-05 PROCEDURE — 76536 US EXAM OF HEAD AND NECK: CPT | Performed by: RADIOLOGY

## 2023-01-05 PROCEDURE — 76536 US EXAM OF HEAD AND NECK: CPT

## 2023-02-21 ENCOUNTER — LAB (OUTPATIENT)
Dept: LAB | Facility: HOSPITAL | Age: 33
End: 2023-02-21
Payer: COMMERCIAL

## 2023-02-21 ENCOUNTER — TRANSCRIBE ORDERS (OUTPATIENT)
Dept: ADMINISTRATIVE | Facility: HOSPITAL | Age: 33
End: 2023-02-21
Payer: COMMERCIAL

## 2023-02-21 DIAGNOSIS — Z34.83 PRENATAL CARE, SUBSEQUENT PREGNANCY, THIRD TRIMESTER: ICD-10-CM

## 2023-02-21 DIAGNOSIS — O13.3 GESTATIONAL HYPERTENSION WITHOUT SIGNIFICANT PROTEINURIA IN THIRD TRIMESTER: ICD-10-CM

## 2023-02-21 DIAGNOSIS — O13.3 GESTATIONAL HYPERTENSION WITHOUT SIGNIFICANT PROTEINURIA IN THIRD TRIMESTER: Primary | ICD-10-CM

## 2023-02-21 PROCEDURE — 83615 LACTATE (LD) (LDH) ENZYME: CPT

## 2023-02-21 PROCEDURE — 36415 COLL VENOUS BLD VENIPUNCTURE: CPT

## 2023-02-21 PROCEDURE — 80053 COMPREHEN METABOLIC PANEL: CPT

## 2023-02-21 PROCEDURE — 84550 ASSAY OF BLOOD/URIC ACID: CPT

## 2023-02-21 PROCEDURE — 85025 COMPLETE CBC W/AUTO DIFF WBC: CPT

## 2023-02-22 LAB
ALBUMIN SERPL-MCNC: 3.6 G/DL (ref 3.5–5.2)
ALBUMIN/GLOB SERPL: 1.2 G/DL
ALP SERPL-CCNC: 111 U/L (ref 39–117)
ALT SERPL W P-5'-P-CCNC: 24 U/L (ref 1–33)
ANION GAP SERPL CALCULATED.3IONS-SCNC: 10 MMOL/L (ref 5–15)
AST SERPL-CCNC: 17 U/L (ref 1–32)
BASOPHILS # BLD AUTO: 0.03 10*3/MM3 (ref 0–0.2)
BASOPHILS NFR BLD AUTO: 0.3 % (ref 0–1.5)
BILIRUB SERPL-MCNC: 0.2 MG/DL (ref 0–1.2)
BUN SERPL-MCNC: 13 MG/DL (ref 6–20)
BUN/CREAT SERPL: 27.1 (ref 7–25)
CALCIUM SPEC-SCNC: 9.9 MG/DL (ref 8.6–10.5)
CHLORIDE SERPL-SCNC: 109 MMOL/L (ref 98–107)
CO2 SERPL-SCNC: 19 MMOL/L (ref 22–29)
CREAT SERPL-MCNC: 0.48 MG/DL (ref 0.57–1)
DEPRECATED RDW RBC AUTO: 47.7 FL (ref 37–54)
EGFRCR SERPLBLD CKD-EPI 2021: 130.9 ML/MIN/1.73
EOSINOPHIL # BLD AUTO: 0.06 10*3/MM3 (ref 0–0.4)
EOSINOPHIL NFR BLD AUTO: 0.7 % (ref 0.3–6.2)
ERYTHROCYTE [DISTWIDTH] IN BLOOD BY AUTOMATED COUNT: 15.1 % (ref 12.3–15.4)
EXTERNAL GROUP B STREP ANTIGEN: NEGATIVE
GLOBULIN UR ELPH-MCNC: 3.1 GM/DL
GLUCOSE SERPL-MCNC: 103 MG/DL (ref 65–99)
HCT VFR BLD AUTO: 37.1 % (ref 34–46.6)
HGB BLD-MCNC: 12.6 G/DL (ref 12–15.9)
IMM GRANULOCYTES # BLD AUTO: 0.11 10*3/MM3 (ref 0–0.05)
IMM GRANULOCYTES NFR BLD AUTO: 1.3 % (ref 0–0.5)
LDH SERPL-CCNC: 158 U/L (ref 135–214)
LYMPHOCYTES # BLD AUTO: 1.69 10*3/MM3 (ref 0.7–3.1)
LYMPHOCYTES NFR BLD AUTO: 19.4 % (ref 19.6–45.3)
MCH RBC QN AUTO: 29.5 PG (ref 26.6–33)
MCHC RBC AUTO-ENTMCNC: 34 G/DL (ref 31.5–35.7)
MCV RBC AUTO: 86.9 FL (ref 79–97)
MONOCYTES # BLD AUTO: 0.64 10*3/MM3 (ref 0.1–0.9)
MONOCYTES NFR BLD AUTO: 7.4 % (ref 5–12)
NEUTROPHILS NFR BLD AUTO: 6.17 10*3/MM3 (ref 1.7–7)
NEUTROPHILS NFR BLD AUTO: 70.9 % (ref 42.7–76)
NRBC BLD AUTO-RTO: 0 /100 WBC (ref 0–0.2)
PLATELET # BLD AUTO: 249 10*3/MM3 (ref 140–450)
PMV BLD AUTO: 10.8 FL (ref 6–12)
POTASSIUM SERPL-SCNC: 3.9 MMOL/L (ref 3.5–5.2)
PROT SERPL-MCNC: 6.7 G/DL (ref 6–8.5)
RBC # BLD AUTO: 4.27 10*6/MM3 (ref 3.77–5.28)
SODIUM SERPL-SCNC: 138 MMOL/L (ref 136–145)
URATE SERPL-MCNC: 5.3 MG/DL (ref 2.4–5.7)
WBC NRBC COR # BLD: 8.7 10*3/MM3 (ref 3.4–10.8)

## 2023-03-03 LAB
EXTERNAL CHLAMYDIA SCREEN: NEGATIVE
EXTERNAL GONORRHEA SCREEN: NEGATIVE

## 2023-03-14 ENCOUNTER — HOSPITAL ENCOUNTER (INPATIENT)
Facility: HOSPITAL | Age: 33
LOS: 2 days | Discharge: HOME | End: 2023-03-16
Attending: OBSTETRICS & GYNECOLOGY | Admitting: OBSTETRICS & GYNECOLOGY
Payer: COMMERCIAL

## 2023-03-14 ENCOUNTER — ANESTHESIA EVENT (OUTPATIENT)
Dept: LABOR AND DELIVERY | Facility: HOSPITAL | Age: 33
End: 2023-03-14
Payer: COMMERCIAL

## 2023-03-14 ENCOUNTER — HOSPITAL ENCOUNTER (INPATIENT)
Dept: LABOR AND DELIVERY | Facility: HOSPITAL | Age: 33
Discharge: HOME | End: 2023-03-14
Payer: COMMERCIAL

## 2023-03-14 ENCOUNTER — ANESTHESIA (OUTPATIENT)
Dept: LABOR AND DELIVERY | Facility: HOSPITAL | Age: 33
End: 2023-03-14
Payer: COMMERCIAL

## 2023-03-14 PROBLEM — O24.419 GESTATIONAL DIABETES: Status: ACTIVE | Noted: 2023-03-14

## 2023-03-14 LAB
ABO GROUP BLD: NORMAL
ALBUMIN SERPL-MCNC: 3.4 G/DL (ref 3.5–5.2)
ALBUMIN/GLOB SERPL: 1 G/DL
ALP SERPL-CCNC: 133 U/L (ref 39–117)
ALT SERPL W P-5'-P-CCNC: 32 U/L (ref 1–33)
AMPHET+METHAMPHET UR QL: NEGATIVE
AMPHETAMINES UR QL: NEGATIVE
ANION GAP SERPL CALCULATED.3IONS-SCNC: 13.9 MMOL/L (ref 5–15)
AST SERPL-CCNC: 20 U/L (ref 1–32)
BARBITURATES UR QL SCN: NEGATIVE
BASOPHILS # BLD AUTO: 0.04 10*3/MM3 (ref 0–0.2)
BASOPHILS NFR BLD AUTO: 0.4 % (ref 0–1.5)
BENZODIAZ UR QL SCN: NEGATIVE
BILIRUB SERPL-MCNC: 0.3 MG/DL (ref 0–1.2)
BLD GP AB SCN SERPL QL: NEGATIVE
BUN SERPL-MCNC: 11 MG/DL (ref 6–20)
BUN/CREAT SERPL: 21.6 (ref 7–25)
BUPRENORPHINE SERPL-MCNC: NEGATIVE NG/ML
CALCIUM SPEC-SCNC: 9.2 MG/DL (ref 8.6–10.5)
CANNABINOIDS SERPL QL: NEGATIVE
CHLORIDE SERPL-SCNC: 106 MMOL/L (ref 98–107)
CO2 SERPL-SCNC: 16.1 MMOL/L (ref 22–29)
COCAINE UR QL: NEGATIVE
CREAT SERPL-MCNC: 0.51 MG/DL (ref 0.57–1)
DEPRECATED RDW RBC AUTO: 51.6 FL (ref 37–54)
EGFRCR SERPLBLD CKD-EPI 2021: 129 ML/MIN/1.73
EOSINOPHIL # BLD AUTO: 0.11 10*3/MM3 (ref 0–0.4)
EOSINOPHIL NFR BLD AUTO: 1.2 % (ref 0.3–6.2)
ERYTHROCYTE [DISTWIDTH] IN BLOOD BY AUTOMATED COUNT: 15.4 % (ref 12.3–15.4)
GLOBULIN UR ELPH-MCNC: 3.4 GM/DL
GLUCOSE SERPL-MCNC: 111 MG/DL (ref 65–99)
HCT VFR BLD AUTO: 41.7 % (ref 34–46.6)
HGB BLD-MCNC: 13.4 G/DL (ref 12–15.9)
IMM GRANULOCYTES # BLD AUTO: 0.19 10*3/MM3 (ref 0–0.05)
IMM GRANULOCYTES NFR BLD AUTO: 2.1 % (ref 0–0.5)
LYMPHOCYTES # BLD AUTO: 2.18 10*3/MM3 (ref 0.7–3.1)
LYMPHOCYTES NFR BLD AUTO: 23.9 % (ref 19.6–45.3)
MCH RBC QN AUTO: 29.3 PG (ref 26.6–33)
MCHC RBC AUTO-ENTMCNC: 32.1 G/DL (ref 31.5–35.7)
MCV RBC AUTO: 91.2 FL (ref 79–97)
METHADONE UR QL SCN: NEGATIVE
MONOCYTES # BLD AUTO: 0.66 10*3/MM3 (ref 0.1–0.9)
MONOCYTES NFR BLD AUTO: 7.2 % (ref 5–12)
NEUTROPHILS NFR BLD AUTO: 5.96 10*3/MM3 (ref 1.7–7)
NEUTROPHILS NFR BLD AUTO: 65.2 % (ref 42.7–76)
NRBC BLD AUTO-RTO: 0 /100 WBC (ref 0–0.2)
OPIATES UR QL: NEGATIVE
OXYCODONE UR QL SCN: NEGATIVE
PCP UR QL SCN: NEGATIVE
PLATELET # BLD AUTO: 232 10*3/MM3 (ref 140–450)
PMV BLD AUTO: 10.7 FL (ref 6–12)
POTASSIUM SERPL-SCNC: 3.9 MMOL/L (ref 3.5–5.2)
PROPOXYPH UR QL: NEGATIVE
PROT SERPL-MCNC: 6.8 G/DL (ref 6–8.5)
RBC # BLD AUTO: 4.57 10*6/MM3 (ref 3.77–5.28)
RH BLD: POSITIVE
SODIUM SERPL-SCNC: 136 MMOL/L (ref 136–145)
T&S EXPIRATION DATE: NORMAL
TRICYCLICS UR QL SCN: NEGATIVE
WBC NRBC COR # BLD: 9.14 10*3/MM3 (ref 3.4–10.8)

## 2023-03-14 PROCEDURE — 0KQM0ZZ REPAIR PERINEUM MUSCLE, OPEN APPROACH: ICD-10-PCS | Performed by: OBSTETRICS & GYNECOLOGY

## 2023-03-14 PROCEDURE — 80306 DRUG TEST PRSMV INSTRMNT: CPT | Performed by: OBSTETRICS & GYNECOLOGY

## 2023-03-14 PROCEDURE — 86900 BLOOD TYPING SEROLOGIC ABO: CPT | Performed by: OBSTETRICS & GYNECOLOGY

## 2023-03-14 PROCEDURE — 86850 RBC ANTIBODY SCREEN: CPT | Performed by: OBSTETRICS & GYNECOLOGY

## 2023-03-14 PROCEDURE — 86901 BLOOD TYPING SEROLOGIC RH(D): CPT | Performed by: OBSTETRICS & GYNECOLOGY

## 2023-03-14 PROCEDURE — 59025 FETAL NON-STRESS TEST: CPT

## 2023-03-14 PROCEDURE — 80053 COMPREHEN METABOLIC PANEL: CPT | Performed by: OBSTETRICS & GYNECOLOGY

## 2023-03-14 PROCEDURE — C1755 CATHETER, INTRASPINAL: HCPCS

## 2023-03-14 PROCEDURE — 85025 COMPLETE CBC W/AUTO DIFF WBC: CPT | Performed by: OBSTETRICS & GYNECOLOGY

## 2023-03-14 RX ORDER — ACETAMINOPHEN 325 MG/1
650 TABLET ORAL EVERY 4 HOURS PRN
Status: DISCONTINUED | OUTPATIENT
Start: 2023-03-14 | End: 2023-03-14 | Stop reason: HOSPADM

## 2023-03-14 RX ORDER — ONDANSETRON 2 MG/ML
4 INJECTION INTRAMUSCULAR; INTRAVENOUS EVERY 6 HOURS PRN
Status: DISCONTINUED | OUTPATIENT
Start: 2023-03-14 | End: 2023-03-14 | Stop reason: HOSPADM

## 2023-03-14 RX ORDER — MAGNESIUM HYDROXIDE 1200 MG/15ML
1000 LIQUID ORAL ONCE AS NEEDED
Status: DISCONTINUED | OUTPATIENT
Start: 2023-03-14 | End: 2023-03-14 | Stop reason: HOSPADM

## 2023-03-14 RX ORDER — HYDROCODONE BITARTRATE AND ACETAMINOPHEN 7.5; 325 MG/1; MG/1
1 TABLET ORAL EVERY 4 HOURS PRN
Status: DISCONTINUED | OUTPATIENT
Start: 2023-03-14 | End: 2023-03-14 | Stop reason: HOSPADM

## 2023-03-14 RX ORDER — OXYTOCIN/0.9 % SODIUM CHLORIDE 30/500 ML
250 PLASTIC BAG, INJECTION (ML) INTRAVENOUS CONTINUOUS
Status: ACTIVE | OUTPATIENT
Start: 2023-03-14 | End: 2023-03-14

## 2023-03-14 RX ORDER — METHYLERGONOVINE MALEATE 0.2 MG/ML
200 INJECTION INTRAVENOUS ONCE AS NEEDED
Status: DISCONTINUED | OUTPATIENT
Start: 2023-03-14 | End: 2023-03-14 | Stop reason: HOSPADM

## 2023-03-14 RX ORDER — BISACODYL 10 MG
10 SUPPOSITORY, RECTAL RECTAL DAILY PRN
Status: DISCONTINUED | OUTPATIENT
Start: 2023-03-15 | End: 2023-03-16 | Stop reason: HOSPADM

## 2023-03-14 RX ORDER — LIDOCAINE HYDROCHLORIDE 20 MG/ML
INJECTION, SOLUTION INFILTRATION; PERINEURAL
Status: DISCONTINUED
Start: 2023-03-14 | End: 2023-03-16 | Stop reason: HOSPADM

## 2023-03-14 RX ORDER — OXYTOCIN/0.9 % SODIUM CHLORIDE 30/500 ML
2-20 PLASTIC BAG, INJECTION (ML) INTRAVENOUS
Status: DISCONTINUED | OUTPATIENT
Start: 2023-03-14 | End: 2023-03-14 | Stop reason: HOSPADM

## 2023-03-14 RX ORDER — ONDANSETRON 2 MG/ML
4 INJECTION INTRAMUSCULAR; INTRAVENOUS EVERY 6 HOURS PRN
Status: DISCONTINUED | OUTPATIENT
Start: 2023-03-14 | End: 2023-03-16 | Stop reason: HOSPADM

## 2023-03-14 RX ORDER — CARBOPROST TROMETHAMINE 250 UG/ML
250 INJECTION, SOLUTION INTRAMUSCULAR AS NEEDED
Status: DISCONTINUED | OUTPATIENT
Start: 2023-03-14 | End: 2023-03-14 | Stop reason: HOSPADM

## 2023-03-14 RX ORDER — SODIUM CHLORIDE 0.9 % (FLUSH) 0.9 %
1-10 SYRINGE (ML) INJECTION AS NEEDED
Status: DISCONTINUED | OUTPATIENT
Start: 2023-03-14 | End: 2023-03-16 | Stop reason: HOSPADM

## 2023-03-14 RX ORDER — MISOPROSTOL 100 UG/1
600 TABLET ORAL AS NEEDED
Status: DISCONTINUED | OUTPATIENT
Start: 2023-03-14 | End: 2023-03-14 | Stop reason: HOSPADM

## 2023-03-14 RX ORDER — EPHEDRINE SULFATE 5 MG/ML
10 INJECTION INTRAVENOUS
Status: DISCONTINUED | OUTPATIENT
Start: 2023-03-14 | End: 2023-03-14 | Stop reason: HOSPADM

## 2023-03-14 RX ORDER — PRENATAL VIT/IRON FUM/FOLIC AC 27MG-0.8MG
1 TABLET ORAL DAILY
Status: DISCONTINUED | OUTPATIENT
Start: 2023-03-15 | End: 2023-03-16 | Stop reason: HOSPADM

## 2023-03-14 RX ORDER — ONDANSETRON 2 MG/ML
4 INJECTION INTRAMUSCULAR; INTRAVENOUS ONCE AS NEEDED
Status: DISCONTINUED | OUTPATIENT
Start: 2023-03-14 | End: 2023-03-14 | Stop reason: HOSPADM

## 2023-03-14 RX ORDER — DIPHENHYDRAMINE HCL 25 MG
25 CAPSULE ORAL NIGHTLY PRN
Status: DISCONTINUED | OUTPATIENT
Start: 2023-03-14 | End: 2023-03-16 | Stop reason: HOSPADM

## 2023-03-14 RX ORDER — IBUPROFEN 600 MG/1
600 TABLET ORAL EVERY 6 HOURS PRN
Status: DISCONTINUED | OUTPATIENT
Start: 2023-03-14 | End: 2023-03-16 | Stop reason: HOSPADM

## 2023-03-14 RX ORDER — ACETAMINOPHEN 325 MG/1
650 TABLET ORAL EVERY 6 HOURS PRN
Status: DISCONTINUED | OUTPATIENT
Start: 2023-03-14 | End: 2023-03-16 | Stop reason: HOSPADM

## 2023-03-14 RX ORDER — BUPIVACAINE HYDROCHLORIDE 2.5 MG/ML
INJECTION, SOLUTION EPIDURAL; INFILTRATION; INTRACAUDAL AS NEEDED
Status: DISCONTINUED | OUTPATIENT
Start: 2023-03-14 | End: 2023-03-14 | Stop reason: SURG

## 2023-03-14 RX ORDER — SODIUM CHLORIDE 0.9 % (FLUSH) 0.9 %
3-10 SYRINGE (ML) INJECTION AS NEEDED
Status: DISCONTINUED | OUTPATIENT
Start: 2023-03-14 | End: 2023-03-14 | Stop reason: HOSPADM

## 2023-03-14 RX ORDER — IBUPROFEN 800 MG/1
800 TABLET ORAL EVERY 8 HOURS SCHEDULED
Status: DISCONTINUED | OUTPATIENT
Start: 2023-03-14 | End: 2023-03-14 | Stop reason: HOSPADM

## 2023-03-14 RX ORDER — HYDROCORTISONE 25 MG/G
1 CREAM TOPICAL AS NEEDED
Status: DISCONTINUED | OUTPATIENT
Start: 2023-03-14 | End: 2023-03-16 | Stop reason: HOSPADM

## 2023-03-14 RX ORDER — METHYLERGONOVINE MALEATE 0.2 MG/ML
200 INJECTION INTRAVENOUS ONCE AS NEEDED
Status: DISCONTINUED | OUTPATIENT
Start: 2023-03-14 | End: 2023-03-16 | Stop reason: HOSPADM

## 2023-03-14 RX ORDER — TERBUTALINE SULFATE 1 MG/ML
0.25 INJECTION, SOLUTION SUBCUTANEOUS AS NEEDED
Status: DISCONTINUED | OUTPATIENT
Start: 2023-03-14 | End: 2023-03-14 | Stop reason: HOSPADM

## 2023-03-14 RX ORDER — ASPIRIN 81 MG/1
TABLET ORAL
Status: CANCELLED | OUTPATIENT
Start: 2023-03-14

## 2023-03-14 RX ORDER — SODIUM CHLORIDE, SODIUM LACTATE, POTASSIUM CHLORIDE, CALCIUM CHLORIDE 600; 310; 30; 20 MG/100ML; MG/100ML; MG/100ML; MG/100ML
125 INJECTION, SOLUTION INTRAVENOUS CONTINUOUS
Status: DISCONTINUED | OUTPATIENT
Start: 2023-03-14 | End: 2023-03-14

## 2023-03-14 RX ORDER — HYDROCODONE BITARTRATE AND ACETAMINOPHEN 5; 325 MG/1; MG/1
1 TABLET ORAL EVERY 4 HOURS PRN
Status: DISCONTINUED | OUTPATIENT
Start: 2023-03-14 | End: 2023-03-16 | Stop reason: HOSPADM

## 2023-03-14 RX ORDER — MISOPROSTOL 100 UG/1
600 TABLET ORAL ONCE AS NEEDED
Status: DISCONTINUED | OUTPATIENT
Start: 2023-03-14 | End: 2023-03-16 | Stop reason: HOSPADM

## 2023-03-14 RX ORDER — CARBOPROST TROMETHAMINE 250 UG/ML
250 INJECTION, SOLUTION INTRAMUSCULAR ONCE AS NEEDED
Status: DISCONTINUED | OUTPATIENT
Start: 2023-03-14 | End: 2023-03-16 | Stop reason: HOSPADM

## 2023-03-14 RX ORDER — ONDANSETRON 4 MG/1
4 TABLET, FILM COATED ORAL EVERY 6 HOURS PRN
Status: DISCONTINUED | OUTPATIENT
Start: 2023-03-14 | End: 2023-03-16 | Stop reason: HOSPADM

## 2023-03-14 RX ORDER — DOCUSATE SODIUM 100 MG/1
100 CAPSULE, LIQUID FILLED ORAL 2 TIMES DAILY
Status: DISCONTINUED | OUTPATIENT
Start: 2023-03-14 | End: 2023-03-16 | Stop reason: HOSPADM

## 2023-03-14 RX ORDER — BUTORPHANOL TARTRATE 1 MG/ML
1 INJECTION, SOLUTION INTRAMUSCULAR; INTRAVENOUS
Status: DISCONTINUED | OUTPATIENT
Start: 2023-03-14 | End: 2023-03-14 | Stop reason: HOSPADM

## 2023-03-14 RX ORDER — ROPIVACAINE HYDROCHLORIDE 2 MG/ML
14 INJECTION, SOLUTION EPIDURAL; INFILTRATION; PERINEURAL CONTINUOUS
Status: DISCONTINUED | OUTPATIENT
Start: 2023-03-14 | End: 2023-03-14

## 2023-03-14 RX ORDER — ONDANSETRON 4 MG/1
4 TABLET, FILM COATED ORAL EVERY 6 HOURS PRN
Status: DISCONTINUED | OUTPATIENT
Start: 2023-03-14 | End: 2023-03-14 | Stop reason: HOSPADM

## 2023-03-14 RX ORDER — HYDROCODONE BITARTRATE AND ACETAMINOPHEN 10; 325 MG/1; MG/1
1 TABLET ORAL EVERY 4 HOURS PRN
Status: DISCONTINUED | OUTPATIENT
Start: 2023-03-14 | End: 2023-03-16 | Stop reason: HOSPADM

## 2023-03-14 RX ORDER — FAMOTIDINE 10 MG/ML
20 INJECTION, SOLUTION INTRAVENOUS ONCE AS NEEDED
Status: DISCONTINUED | OUTPATIENT
Start: 2023-03-14 | End: 2023-03-14 | Stop reason: HOSPADM

## 2023-03-14 RX ORDER — OXYTOCIN/0.9 % SODIUM CHLORIDE 30/500 ML
999 PLASTIC BAG, INJECTION (ML) INTRAVENOUS ONCE
Status: COMPLETED | OUTPATIENT
Start: 2023-03-14 | End: 2023-03-14

## 2023-03-14 RX ORDER — PRENATAL VIT/IRON FUM/FOLIC AC 27MG-0.8MG
1 TABLET ORAL DAILY
Status: DISCONTINUED | OUTPATIENT
Start: 2023-03-14 | End: 2023-03-14 | Stop reason: SDUPTHER

## 2023-03-14 RX ADMIN — EPHEDRINE SULFATE 10 MG: 5 INJECTION INTRAVENOUS at 09:57

## 2023-03-14 RX ADMIN — Medication 1 APPLICATION: at 15:17

## 2023-03-14 RX ADMIN — BENZOCAINE 1 APPLICATION: 5.6 OINTMENT TOPICAL at 15:17

## 2023-03-14 RX ADMIN — IBUPROFEN 600 MG: 600 TABLET, FILM COATED ORAL at 14:01

## 2023-03-14 RX ADMIN — Medication 999 ML/HR: at 10:55

## 2023-03-14 RX ADMIN — DOCUSATE SODIUM 100 MG: 100 CAPSULE ORAL at 20:22

## 2023-03-14 RX ADMIN — SODIUM CHLORIDE, POTASSIUM CHLORIDE, SODIUM LACTATE AND CALCIUM CHLORIDE 125 ML/HR: 600; 310; 30; 20 INJECTION, SOLUTION INTRAVENOUS at 06:45

## 2023-03-14 RX ADMIN — WITCH HAZEL 1 PAD: 500 SOLUTION RECTAL; TOPICAL at 15:17

## 2023-03-14 RX ADMIN — SODIUM CHLORIDE, POTASSIUM CHLORIDE, SODIUM LACTATE AND CALCIUM CHLORIDE 125 ML/HR: 600; 310; 30; 20 INJECTION, SOLUTION INTRAVENOUS at 07:53

## 2023-03-14 RX ADMIN — HYDROCODONE BITARTRATE AND ACETAMINOPHEN 1 TABLET: 10; 325 TABLET ORAL at 20:24

## 2023-03-14 RX ADMIN — BUPIVACAINE HYDROCHLORIDE 10 ML: 2.5 INJECTION, SOLUTION EPIDURAL; INFILTRATION; INTRACAUDAL; PERINEURAL at 09:36

## 2023-03-14 RX ADMIN — Medication 250 ML/HR: at 11:13

## 2023-03-14 RX ADMIN — BENZOCAINE AND LEVOMENTHOL: 200; 5 SPRAY TOPICAL at 15:17

## 2023-03-14 RX ADMIN — Medication 2 MILLI-UNITS/MIN: at 07:48

## 2023-03-14 RX ADMIN — SODIUM CHLORIDE, POTASSIUM CHLORIDE, SODIUM LACTATE AND CALCIUM CHLORIDE 1000 ML: 600; 310; 30; 20 INJECTION, SOLUTION INTRAVENOUS at 09:02

## 2023-03-14 NOTE — PLAN OF CARE
Problem: Adult Inpatient Plan of Care  Goal: Plan of Care Review  Outcome: Ongoing, Progressing   Goal Outcome Evaluation:                 Tolerated labor well, will cont to monitor pp

## 2023-03-14 NOTE — PLAN OF CARE
Goal Outcome Evaluation:  Plan of Care Reviewed With: patient        Progress: improving  Outcome Evaluation: ambulating well/ small rubra/ voiding without difficulty

## 2023-03-14 NOTE — NON STRESS TEST
Urvashi Rinaldi, a  at 38w0d with an CHILO of 3/28/2023, by Last Menstrual Period, was seen at [facility] for a nonstress test.    Chief Complaint   Patient presents with   • Scheduled Induction     D/T UNCONTROLLED GDM.        Patient Active Problem List   Diagnosis   • Preeclampsia in postpartum period   • Postpartum hypertension   • Left tubal pregnancy without intrauterine pregnancy   • Thyroiditis   • Gestational diabetes       Start Time: 0700  Stop Time: 0730        Reactive NST verified with Vivian SHEARER RN

## 2023-03-14 NOTE — ANESTHESIA PROCEDURE NOTES
Labor Epidural    Pre-sedation assessment completed: 3/14/2023 9:24 AM    Patient reassessed immediately prior to procedure    Patient location during procedure: OB  Start Time: 3/14/2023 9:24 AM  Stop Time: 3/14/2023 9:35 AM  Indication:at surgeon's request  Performed By  Anesthesiologist: Grayson Arguelles MD  Preanesthetic Checklist  Completed: patient identified, IV checked, site marked, risks and benefits discussed, surgical consent, monitors and equipment checked, pre-op evaluation and timeout performed  Prep:  Pt Position:sitting  Sterile Tech:gloves, mask, sterile barrier and cap  Prep:povidone-iodine 7.5% surgical scrub  Monitoring:blood pressure monitoring  Epidural Block Procedure:  Approach:midline  Guidance:landmark technique and palpation technique  Location:L3-L4  Needle Type:Tuohy  Needle Gauge:17 G  Loss of Resistance Medium: air  Loss of Resistance: 6cm  Cath Depth at skin:9 cm  Paresthesia: none  Aspiration:negative  Test Dose:negative  Number of Attempts: 1  Post Assessment:  Dressing:occlusive dressing applied and secured with tape  Pt Tolerance:patient tolerated the procedure well with no apparent complications  Complications:no

## 2023-03-14 NOTE — L&D DELIVERY NOTE
[facility]  Vaginal Delivery Note    Delivery     Delivery: Vaginal, Spontaneous     YOB: 2021    Time of Birth: 10:50 AM      Anesthesia: Epidural     Delivering clinician: Karel Salinas    Forceps?   No   Vacuum? No    Shoulder dystocia present: No        Delivery narrative:      Infant    Findings: female  infant     Infant observations: Weight: 3766 g (8 lb 4.8 oz)   Length: 20.472  in  Observations/Comments:        Apgars: 8  @ 1 minute /    9  @ 5 minutes   Infant Name:      Placenta, Cord, and Fluid    Placenta delivered  Spontaneous  at  3/14/2023 10:55 AM     Cord: 3 vessels  present.   Nuchal Cord?  no   Cord blood obtained: Yes                   Repair    Episiotomy: None    Lacerations: Yes  Laceration Information  Laceration Repaired?   Perineal: 2nd      Periurethral:       Labial:       Sulcus:       Vaginal:       Cervical:         Suture used for repair: 2-0 chromic gut   Estimated Blood Loss: 100  mls.   Suture used for repair:      Complications  none    Disposition  Mother to postpartum in stable condition.    Karel Salinas DO  03/14/23  15:46 EDT

## 2023-03-14 NOTE — ANESTHESIA PREPROCEDURE EVALUATION
Anesthesia Evaluation     no history of anesthetic complications:  NPO Solid Status: > 8 hours  NPO Liquid Status: > 8 hours           Airway   Mallampati: II  TM distance: >3 FB  Neck ROM: full  No difficulty expected  Dental - normal exam     Pulmonary - normal exam   Cardiovascular - normal exam    (+) hypertension,       Neuro/Psych  GI/Hepatic/Renal/Endo    (+)   diabetes mellitus, thyroid problem     Musculoskeletal     Abdominal  - normal exam    Bowel sounds: normal.   Substance History      OB/GYN    (+) Pregnant, Preeclampsia,         Other                        Anesthesia Plan    ASA 2     epidural       Anesthetic plan, risks, benefits, and alternatives have been provided, discussed and informed consent has been obtained with: patient.        CODE STATUS:

## 2023-03-15 LAB
BASOPHILS # BLD AUTO: 0.04 10*3/MM3 (ref 0–0.2)
BASOPHILS NFR BLD AUTO: 0.5 % (ref 0–1.5)
DEPRECATED RDW RBC AUTO: 53.3 FL (ref 37–54)
EOSINOPHIL # BLD AUTO: 0.13 10*3/MM3 (ref 0–0.4)
EOSINOPHIL NFR BLD AUTO: 1.7 % (ref 0.3–6.2)
ERYTHROCYTE [DISTWIDTH] IN BLOOD BY AUTOMATED COUNT: 15.7 % (ref 12.3–15.4)
HCT VFR BLD AUTO: 38.5 % (ref 34–46.6)
HGB BLD-MCNC: 12.3 G/DL (ref 12–15.9)
IMM GRANULOCYTES # BLD AUTO: 0.09 10*3/MM3 (ref 0–0.05)
IMM GRANULOCYTES NFR BLD AUTO: 1.2 % (ref 0–0.5)
LYMPHOCYTES # BLD AUTO: 2.25 10*3/MM3 (ref 0.7–3.1)
LYMPHOCYTES NFR BLD AUTO: 28.8 % (ref 19.6–45.3)
MCH RBC QN AUTO: 29.9 PG (ref 26.6–33)
MCHC RBC AUTO-ENTMCNC: 31.9 G/DL (ref 31.5–35.7)
MCV RBC AUTO: 93.4 FL (ref 79–97)
MONOCYTES # BLD AUTO: 0.52 10*3/MM3 (ref 0.1–0.9)
MONOCYTES NFR BLD AUTO: 6.7 % (ref 5–12)
NEUTROPHILS NFR BLD AUTO: 4.78 10*3/MM3 (ref 1.7–7)
NEUTROPHILS NFR BLD AUTO: 61.1 % (ref 42.7–76)
NRBC BLD AUTO-RTO: 0 /100 WBC (ref 0–0.2)
PLATELET # BLD AUTO: 195 10*3/MM3 (ref 140–450)
PMV BLD AUTO: 10.2 FL (ref 6–12)
RBC # BLD AUTO: 4.12 10*6/MM3 (ref 3.77–5.28)
WBC NRBC COR # BLD: 7.81 10*3/MM3 (ref 3.4–10.8)

## 2023-03-15 PROCEDURE — 85025 COMPLETE CBC W/AUTO DIFF WBC: CPT | Performed by: OBSTETRICS & GYNECOLOGY

## 2023-03-15 RX ADMIN — HYDROCODONE BITARTRATE AND ACETAMINOPHEN 1 TABLET: 10; 325 TABLET ORAL at 00:24

## 2023-03-15 RX ADMIN — IBUPROFEN 600 MG: 600 TABLET, FILM COATED ORAL at 19:04

## 2023-03-15 RX ADMIN — PRENATAL VIT W/ FE FUMARATE-FA TAB 27-0.8 MG 1 TABLET: 27-0.8 TAB at 09:23

## 2023-03-15 RX ADMIN — IBUPROFEN 600 MG: 600 TABLET, FILM COATED ORAL at 11:55

## 2023-03-15 RX ADMIN — DOCUSATE SODIUM 100 MG: 100 CAPSULE ORAL at 09:23

## 2023-03-15 RX ADMIN — HYDROCODONE BITARTRATE AND ACETAMINOPHEN 1 TABLET: 10; 325 TABLET ORAL at 06:28

## 2023-03-15 RX ADMIN — IBUPROFEN 600 MG: 600 TABLET, FILM COATED ORAL at 00:24

## 2023-03-15 RX ADMIN — DOCUSATE SODIUM 100 MG: 100 CAPSULE ORAL at 21:26

## 2023-03-15 NOTE — PLAN OF CARE
Goal Outcome Evaluation:  Plan of Care Reviewed With: patient        Progress: improving  Outcome Evaluation: monitoring blood pressure/ dtr's wnl  no clonus/ no visual changes/  small rubra

## 2023-03-15 NOTE — PROGRESS NOTES
" [place]  Vaginal Delivery Progress Note    Subjective   Subjective  Postpartum Day 1: Vaginal Delivery    The patient feels well.  Her pain is well controlled with nonsteroidal anti-inflammatory drugs.   She is ambulating well.  Patient describes her bleeding as thin lochia.    Breastfeeding: infant latching without difficulty.    Objective     Objective   Vital Signs Range for the last 24 hours  Temperature: Temp:  [96.8 °F (36 °C)-98.7 °F (37.1 °C)] 97.5 °F (36.4 °C)   Temp Source: Temp src: Oral   BP: BP: ()/(55-99) 126/91   Pulse: Heart Rate:  [] 108   Respirations: Resp:  [16-18] 18   Weight:       Admit Height:  Height: 167.6 cm (66\")    Physical Exam:  General:  no acute distresss.  Abdomen: Fundus: appropriate, firm, non tender  Extremities: normal, atraumatic, no cyanosis, and trace edema.       [unfilled]       Lab Results   Component Value Date    ABO O 03/14/2023    RH Positive 03/14/2023        Lab Results   Component Value Date    HGB 12.3 03/15/2023    HCT 38.5 03/15/2023         Assessment & Plan   Assessment & Plan    Gestational diabetes    Postpartum care following vaginal delivery      Urvashi Rinaldi is Day 1  post-partum  Vaginal, Spontaneous   .      Plan:  Continue current care.      Connie Garcia, APRN  3/15/2023  08:28 EDT    "

## 2023-03-15 NOTE — PLAN OF CARE
Goal Outcome Evaluation:  Plan of Care Reviewed With: patient        Progress: improving  Outcome Evaluation: Pt bonding well with infant. Ambulating independently. Voiding without difficulty. Fundus firm and midline with light lochia rubra noted. VSS.

## 2023-03-16 VITALS
HEIGHT: 66 IN | RESPIRATION RATE: 17 BRPM | TEMPERATURE: 98.2 F | DIASTOLIC BLOOD PRESSURE: 99 MMHG | OXYGEN SATURATION: 98 % | WEIGHT: 213 LBS | SYSTOLIC BLOOD PRESSURE: 137 MMHG | HEART RATE: 94 BPM | BODY MASS INDEX: 34.23 KG/M2

## 2023-03-16 RX ORDER — IBUPROFEN 600 MG/1
600 TABLET ORAL EVERY 6 HOURS PRN
Qty: 40 TABLET | Refills: 1 | Status: SHIPPED | OUTPATIENT
Start: 2023-03-16

## 2023-03-16 RX ADMIN — HYDROCODONE BITARTRATE AND ACETAMINOPHEN 1 TABLET: 10; 325 TABLET ORAL at 00:20

## 2023-03-16 RX ADMIN — DOCUSATE SODIUM 100 MG: 100 CAPSULE ORAL at 08:04

## 2023-03-16 RX ADMIN — PRENATAL VIT W/ FE FUMARATE-FA TAB 27-0.8 MG 1 TABLET: 27-0.8 TAB at 08:04

## 2023-03-16 RX ADMIN — HYDROCODONE BITARTRATE AND ACETAMINOPHEN 1 TABLET: 5; 325 TABLET ORAL at 10:19

## 2023-03-16 RX ADMIN — IBUPROFEN 600 MG: 600 TABLET, FILM COATED ORAL at 05:04

## 2023-03-16 NOTE — PLAN OF CARE
Goal Outcome Evaluation:  Plan of Care Reviewed With: patient        Progress: improving  Outcome Evaluation: Patient progressing well toward goals. Fundus firm and at midline. Scant lochia rubra noted. PRN pain medication administered x1. DTRs WNL. Clonus absent bilaterally. Pt voiding spontaneously and ambulating independently. VSS. Pt is being discharged home today; order in place per APRN.

## 2023-03-16 NOTE — PLAN OF CARE
Goal Outcome Evaluation:  Plan of Care Reviewed With: patient        Progress: improving  Outcome Evaluation: pt fundus firm, light rubra. dtr's WNL,  no clonues. denies headache or vision changes. c/o cramping/back pain, PRN norco given. effective per pt

## 2023-03-16 NOTE — DISCHARGE SUMMARY
"SUS[facility]  Delivery Discharge Summary    Primary OB Clinician:     EDC: Estimated Date of Delivery: 3/28/23    Gestational Age:38w0d    Antepartum complications: gestational diabetes and pregnancy-induced hypertension    Date of Delivery: 3/14/2023   Time of Delivery: 10:50 AM     Delivered By:  Karel Salinas     Delivery Type: Vaginal, Spontaneous      Baby:  female    Apgar:  8  @ 1 minute /   Apgar:  9  @ 5 minutes   Weight:  3766 g (8 lb 4.8 oz)     Anesthesia: Epidural      Intrapartum complications: None    Rh Immune globulin given: not applicable    Subjective   Subjective  Postpartum Day 2: Vaginal Delivery    The patient feels well.  Her pain is well controlled with nonsteroidal anti-inflammatory drugs.   She is ambulating well.  Patient describes her bleeding as thin lochia.    Breastfeeding: without difficulty.    Objective     Objective   Vital Signs Range for the last 24 hours  Temperature: Temp:  [97.2 °F (36.2 °C)-98.6 °F (37 °C)] 97.8 °F (36.6 °C)   Temp Source: Temp src: Oral   BP: BP: (126-145)/(79-93) 127/79   Pulse: Heart Rate:  [77-96] 77   Respirations: Resp:  [16-18] 17   Weight:       Admit Height:  Height: 167.6 cm (66\")    Physical Exam:  General:  no acute distress.  Abdomen: Fundus: appropriate, firm, non tender  Extremities: normal, atraumatic, no cyanosis, and trace edema.     [unfilled]       Lab Results   Component Value Date    ABO O 2023    RH Positive 2023        Lab Results   Component Value Date    HGB 12.3 03/15/2023    HCT 38.5 03/15/2023         Assesment and Plan:      Gestational diabetes    Postpartum care following vaginal delivery    Assessment & Plan    Plan:    Outpatient Lactation Referral ordered if needed by patient.   BP and blood glucose well controlled without medication.  Advised patient to make follow up appt with PCP as well, in the next 2-4 weeks to ensure no further issues with diabetes or hypertension.    Follow-up appointment with Nhung" [facility] in 3 weeks.    Discharge Date: 3/16/2023; Discharge Time: 09:16 EDT        SHANICE Angela  3/16/2023  09:16 EDT

## 2023-03-20 NOTE — H&P
The patient is admitted for induction of labor secondary to gestational hypertension.  She also had uncontrolled gestational diabetes.  Prenatal record otherwise unchanged.

## 2023-08-09 ENCOUNTER — OFFICE VISIT (OUTPATIENT)
Dept: ENDOCRINOLOGY | Facility: CLINIC | Age: 33
End: 2023-08-09
Payer: COMMERCIAL

## 2023-08-09 VITALS
WEIGHT: 200.6 LBS | OXYGEN SATURATION: 97 % | BODY MASS INDEX: 32.24 KG/M2 | SYSTOLIC BLOOD PRESSURE: 126 MMHG | DIASTOLIC BLOOD PRESSURE: 90 MMHG | HEART RATE: 119 BPM | HEIGHT: 66 IN

## 2023-08-09 DIAGNOSIS — E06.9 THYROIDITIS: Primary | ICD-10-CM

## 2023-08-09 DIAGNOSIS — Z86.32 HISTORY OF GESTATIONAL DIABETES: ICD-10-CM

## 2023-08-09 LAB
ALBUMIN SERPL-MCNC: 4.4 G/DL (ref 3.5–5.2)
ALBUMIN/GLOB SERPL: 1.4 G/DL
ALP SERPL-CCNC: 90 U/L (ref 39–117)
ALT SERPL W P-5'-P-CCNC: 27 U/L (ref 1–33)
ANION GAP SERPL CALCULATED.3IONS-SCNC: 12.4 MMOL/L (ref 5–15)
AST SERPL-CCNC: 14 U/L (ref 1–32)
BILIRUB SERPL-MCNC: 0.2 MG/DL (ref 0–1.2)
BUN SERPL-MCNC: 16 MG/DL (ref 6–20)
BUN/CREAT SERPL: 24.6 (ref 7–25)
CALCIUM SPEC-SCNC: 9.8 MG/DL (ref 8.6–10.5)
CHLORIDE SERPL-SCNC: 107 MMOL/L (ref 98–107)
CHOLEST SERPL-MCNC: 182 MG/DL (ref 0–200)
CO2 SERPL-SCNC: 22.6 MMOL/L (ref 22–29)
CREAT SERPL-MCNC: 0.65 MG/DL (ref 0.57–1)
EGFRCR SERPLBLD CKD-EPI 2021: 120.9 ML/MIN/1.73
GLOBULIN UR ELPH-MCNC: 3.1 GM/DL
GLUCOSE SERPL-MCNC: 102 MG/DL (ref 65–99)
HBA1C MFR BLD: 5.1 % (ref 4.8–5.6)
HDLC SERPL-MCNC: 47 MG/DL (ref 40–60)
LDLC SERPL CALC-MCNC: 94 MG/DL (ref 0–100)
LDLC/HDLC SERPL: 1.85 {RATIO}
POTASSIUM SERPL-SCNC: 3.8 MMOL/L (ref 3.5–5.2)
PROT SERPL-MCNC: 7.5 G/DL (ref 6–8.5)
SODIUM SERPL-SCNC: 142 MMOL/L (ref 136–145)
T4 FREE SERPL-MCNC: 1.23 NG/DL (ref 0.93–1.7)
TRIGL SERPL-MCNC: 241 MG/DL (ref 0–150)
TSH SERPL DL<=0.05 MIU/L-ACNC: 0.78 UIU/ML (ref 0.27–4.2)
VLDLC SERPL-MCNC: 41 MG/DL (ref 5–40)

## 2023-08-09 PROCEDURE — 84439 ASSAY OF FREE THYROXINE: CPT | Performed by: NURSE PRACTITIONER

## 2023-08-09 PROCEDURE — 86376 MICROSOMAL ANTIBODY EACH: CPT | Performed by: NURSE PRACTITIONER

## 2023-08-09 PROCEDURE — 80053 COMPREHEN METABOLIC PANEL: CPT | Performed by: NURSE PRACTITIONER

## 2023-08-09 PROCEDURE — 83036 HEMOGLOBIN GLYCOSYLATED A1C: CPT | Performed by: NURSE PRACTITIONER

## 2023-08-09 PROCEDURE — 86800 THYROGLOBULIN ANTIBODY: CPT | Performed by: NURSE PRACTITIONER

## 2023-08-09 PROCEDURE — 99213 OFFICE O/P EST LOW 20 MIN: CPT | Performed by: NURSE PRACTITIONER

## 2023-08-09 PROCEDURE — 80061 LIPID PANEL: CPT | Performed by: NURSE PRACTITIONER

## 2023-08-09 PROCEDURE — 84443 ASSAY THYROID STIM HORMONE: CPT | Performed by: NURSE PRACTITIONER

## 2023-08-09 NOTE — ASSESSMENT & PLAN NOTE
- Clinically euthyroid.  -TFTs today with medication management as indicated.  -Follow-up in 3 months.  -Will obtain repeat US Thyroid in Jan 2024.

## 2023-08-09 NOTE — PROGRESS NOTES
Chief Complaint   Patient presents with    Thyroid Problem          Urvashi Rinaldi is a 33 y.o. female had concerns including Thyroid Problem.   Thyroid.    She gave birth to her daughter in 03/2023.  She denies any symptoms and any changes to her neck.  She denies any new symptoms.      Most recent US Thyroid:  01/2023  FINDINGS:    LEFT THYROID LOBE:  Thyroid gland is mildly enlarged.  Left lobe of thyroid hypoechoic nodule appears grossly stable measuring about 6 mm. Other tiny cysts are stable.    RIGHT THYROID LOBE:  See above.    ISTHMUS:  Unremarkable.  No enlarged or calcified nodules.    LYMPH NODES:  Unremarkable.  No lymphadenopathy.   IMPRESSION:    Thyroid gland is mildly enlarged.    History:  She was noted to have an enlarged thyroid at her GYN appt.  They did an ultrasound on 01/21/2022 and noted a 1 cm primarily cystic nodule in the right lobe of the thyroid most likely representing a colloid cyst. Left lobe is heterogeneous and shows a 5 mm solid nodule.  IMPRESSION:  Nodules bilaterally with the largest nodule being cystic in  the right lobe and likely representing a colloid cyst.      She had an ectopic pregnancy in Nov 2021, she had a methotrexate shot at this time.  In 2020 she was noted to have hyperthyroidism around 35+ weeks but gave birth prior to being evaluated.    Most recent TFT's:  12/28/2021:  TSH: 0.518 (0.270-4.200)  Free T4: 1.13 (0.93-1.70)    Birth state: [place]  Previous history of radiation to face/neck: none  Consuming foods high in iodine: none  Family history of thyroid complications: none    The following portions of the patient's history were reviewed and updated as appropriate: allergies, current medications, past family history, past medical history, past social history, past surgical history and problem list.    Past Medical History:   Diagnosis Date    Gestational diabetes 3/14/2023    Hyperthyroidism     Pregnancy     Urinary tract infection      Past Surgical  History:   Procedure Laterality Date    LAPAROSCOPIC EXPLORATION FOR ECTOPIC PREGNANCY Left 11/16/2021    Procedure: LAPAROSCOPIC EXPLORATION FOR ECTOPIC PREGNANCY with Left Salpingostomy;  Surgeon: Karel Salinas DO;  Location: [facility];  Service: Obstetrics/Gynecology;  Laterality: Left;    SALPINGECTOMY Left     WISDOM TOOTH EXTRACTION  2014      Family History   Problem Relation Age of Onset    No Known Problems Mother     Heart disease Father     Hypertension Father     Heart valve disorder Father     Ovarian cancer Maternal Grandmother     No Known Problems Maternal Grandfather     Hypertension Paternal Grandmother     No Known Problems Paternal Grandfather       Social History     Socioeconomic History    Marital status:    Tobacco Use    Smoking status: Never    Smokeless tobacco: Never   Vaping Use    Vaping Use: Never used   Substance and Sexual Activity    Alcohol use: Not Currently    Drug use: Never    Sexual activity: Defer      No Known Allergies   Current Outpatient Medications on File Prior to Visit   Medication Sig Dispense Refill    Prenatal Vit-Fe Fumarate-FA ([facility] PRENATAL PO) Take 1 tablet by mouth Daily.      ibuprofen (ADVIL,MOTRIN) 600 MG tablet Take 1 tablet by mouth Every 6 (Six) Hours As Needed for Mild Pain. (Patient not taking: Reported on 8/9/2023) 40 tablet 1     No current facility-administered medications on file prior to visit.      Review of Systems   Constitutional:  Positive for fatigue. Negative for unexpected weight gain and unexpected weight loss.   Eyes: Negative.    Cardiovascular:  Positive for palpitations.   Gastrointestinal: Negative.    Endocrine: Negative for cold intolerance and heat intolerance.   Neurological:  Positive for tremors.   Psychiatric/Behavioral:  Positive for agitation and sleep disturbance. The patient is nervous/anxious.    All other systems reviewed and are negative.     /90 (BP Location: Left arm, Patient Position: Sitting,  "Cuff Size: Adult)   Pulse 119   Ht 167.6 cm (66\")   Wt 91 kg (200 lb 9.6 oz)   LMP 07/26/2023   SpO2 97%   BMI 32.38 kg/mý      Physical Exam  Vitals reviewed.   Constitutional:       Appearance: Normal appearance.   Eyes:      Extraocular Movements: Extraocular movements intact.   Neck:      Comments: Moderate overall enlargement of the thyroid gland with tenderness. No palpable nodules.  Cardiovascular:      Rate and Rhythm: Regular rhythm. Tachycardia present.      Pulses: Normal pulses.      Heart sounds: Normal heart sounds.   Pulmonary:      Effort: Pulmonary effort is normal.      Breath sounds: Normal breath sounds.   Skin:     General: Skin is warm.   Neurological:      Mental Status: She is alert and oriented to person, place, and time.   Psychiatric:         Mood and Affect: Mood normal.         Behavior: Behavior normal.         Thought Content: Thought content normal.         Judgment: Judgment normal.     Labs/Imaging  CMP  Lab Results   Component Value Date    GLUCOSE 102 (H) 08/09/2023    BUN 16 08/09/2023    CREATININE 0.65 08/09/2023    EGFRIFNONA 106 11/16/2021    BCR 24.6 08/09/2023    K 3.8 08/09/2023    CO2 22.6 08/09/2023    CALCIUM 9.8 08/09/2023    ALBUMIN 4.4 08/09/2023    AST 14 08/09/2023    ALT 27 08/09/2023        CBC w/DIFF   Lab Results   Component Value Date    WBC 7.81 03/15/2023    RBC 4.12 03/15/2023    HGB 12.3 03/15/2023    HCT 38.5 03/15/2023    MCV 93.4 03/15/2023    MCH 29.9 03/15/2023    MCHC 31.9 03/15/2023    RDW 15.7 (H) 03/15/2023    RDWSD 53.3 03/15/2023    MPV 10.2 03/15/2023     03/15/2023    NEUTRORELPCT 61.1 03/15/2023    LYMPHORELPCT 28.8 03/15/2023    MONORELPCT 6.7 03/15/2023    EOSRELPCT 1.7 03/15/2023    BASORELPCT 0.5 03/15/2023    AUTOIGPER 1.2 (H) 03/15/2023    NEUTROABS 4.78 03/15/2023    LYMPHSABS 2.25 03/15/2023    MONOSABS 0.52 03/15/2023    EOSABS 0.13 03/15/2023    BASOSABS 0.04 03/15/2023    AUTOIGNUM 0.09 (H) 03/15/2023    NRBC 0.0 " 03/15/2023       TSH  Lab Results   Component Value Date    TSH 0.779 08/09/2023    TSH 0.385 08/09/2022    TSH 0.443 04/13/2022       T4  Lab Results   Component Value Date    FREET4 1.23 08/09/2023    FREET4 1.35 08/09/2022    FREET4 1.23 02/15/2022     No results found for: S6OJMLN    T3  Lab Results   Component Value Date    T3FREE 3.84 08/09/2022    T3FREE 3.37 02/15/2022     No results found for: L1PPEMO    TRAb  No results found for: TSURCPAB    TPO  No results found for: THYROIDAB    No valid procedures specified.    Assessment and Plan    Diagnoses and all orders for this visit:    1. Thyroiditis (Primary)  Assessment & Plan:  - Clinically euthyroid.  -TFTs today with medication management as indicated.  -Follow-up in 3 months.  -Will obtain repeat US Thyroid in Jan 2024.    Orders:  -     TSH  -     T4, free  -     Thyroid Antibodies    2. History of gestational diabetes  -     Comprehensive Metabolic Panel  -     Lipid Panel  -     Hemoglobin A1c         Return in about 3 months (around 11/9/2023) for Follow-up appointment. The patient was instructed to contact the clinic with any interval questions or concerns.    This document has been electronically signed by SHANICE Santamaria  August 9, 2023 13:57 EDT  Endocrinology

## 2023-08-10 LAB
THYROGLOB AB SERPL-ACNC: <1 IU/ML (ref 0–0.9)
THYROPEROXIDASE AB SERPL-ACNC: <9 IU/ML (ref 0–34)

## 2023-09-18 RX ORDER — VALACYCLOVIR HYDROCHLORIDE 1 G/1
1000 TABLET, FILM COATED ORAL 3 TIMES DAILY
Qty: 21 TABLET | Refills: 0 | Status: SHIPPED | OUTPATIENT
Start: 2023-09-18 | End: 2023-09-25

## 2023-11-03 DIAGNOSIS — E06.9 THYROIDITIS: Primary | ICD-10-CM

## 2024-01-23 ENCOUNTER — HOSPITAL ENCOUNTER (OUTPATIENT)
Facility: HOSPITAL | Age: 34
Discharge: HOME | End: 2024-01-23
Admitting: NURSE PRACTITIONER
Payer: COMMERCIAL

## 2024-01-23 DIAGNOSIS — E06.9 THYROIDITIS: ICD-10-CM

## 2024-01-23 PROCEDURE — 76536 US EXAM OF HEAD AND NECK: CPT

## 2024-01-23 PROCEDURE — 76536 US EXAM OF HEAD AND NECK: CPT | Performed by: RADIOLOGY

## 2024-09-05 ENCOUNTER — TELEPHONE (OUTPATIENT)
Dept: FAMILY MEDICINE CLINIC | Facility: CLINIC | Age: 34
End: 2024-09-05
Payer: COMMERCIAL

## 2024-09-05 RX ORDER — BUSPIRONE HYDROCHLORIDE 5 MG/1
5 TABLET ORAL 3 TIMES DAILY
Qty: 90 TABLET | Refills: 1 | Status: SHIPPED | OUTPATIENT
Start: 2024-09-05

## 2024-09-05 NOTE — TELEPHONE ENCOUNTER
Caller: Urvashi Rinaldi    Relationship: Self    Best call back number: [phone]     What medication are you requesting: BUSPERONE      Have you had these symptoms before:    [x] Yes  [] No    Have you been treated for these symptoms before:   [x] Yes  [] No    If a prescription is needed, what is your preferred pharmacy and phone number: [facility] [id number] [place], [place] - [address] [facility] AT [address] [place] [address] [phone] [facility] [phone]      Additional notes:PATIENT WOULD LIKE TO KNOW IF ANTHONY LAM WOULD SEND THIS MEDICATION IN SHE STATES THAT SHE USED TO BE ON IT AND HAS AN APPOINTMENT ON 9/10/24

## 2024-09-10 ENCOUNTER — TELEMEDICINE (OUTPATIENT)
Dept: FAMILY MEDICINE CLINIC | Facility: CLINIC | Age: 34
End: 2024-09-10
Payer: COMMERCIAL

## 2024-09-10 DIAGNOSIS — F32.89 OTHER DEPRESSION: Primary | ICD-10-CM

## 2024-09-10 PROCEDURE — 99214 OFFICE O/P EST MOD 30 MIN: CPT | Performed by: FAMILY MEDICINE

## 2024-09-10 RX ORDER — SERTRALINE HYDROCHLORIDE 25 MG/1
25 TABLET, FILM COATED ORAL DAILY
Qty: 90 TABLET | Refills: 0 | Status: SHIPPED | OUTPATIENT
Start: 2024-09-10

## 2024-09-10 RX ORDER — BUSPIRONE HYDROCHLORIDE 5 MG/1
5 TABLET ORAL 3 TIMES DAILY
Qty: 90 TABLET | Refills: 1 | Status: SHIPPED | OUTPATIENT
Start: 2024-09-10

## 2024-09-10 NOTE — PROGRESS NOTES
[facility]     VITALS: unknown if currently breastfeeding.    Subjective  Chief Complaint: No chief complaint on file.       History of Present Illness:  You have chosen to receive care through a telehealth visit.  Do you consent to use a video/audio connection for your medical care today? Yes    The use of a video visit has been reviewed with the patient and verbal informed consent has been obtained.    This visit was conducted with the use of a interactive audio and video telecommunication system that permits real-time communication between patient and provider.  Patient consent for this virtual visit was obtained before the visit.  The patient was at their home under the 2020 Coronavirus Preparedness and Respond  Supplemental Appropriation Act.  Provider was at the office.    Subjective            Patient is a 34 y.o.  female with medical conditions significant for depression, who presents to a visit secondary to an acute concern. States she recently  from her  and has been experiencing more depression. States she was previously on buspar and this didn't help much. States she would like to try something else. Denies any homicidal or suicidal thoughts.     No complaints about any of the medications.    The following portions of the patient's history were reviewed and updated as appropriate: allergies, current medications, past family history, past medical history, past social history, past surgical history and problem list.    Past Medical History  Past Medical History:   Diagnosis Date    Gestational diabetes 3/14/2023    Hyperthyroidism     Pregnancy     Urinary tract infection        Review of Systems    Surgical History  Past Surgical History:   Procedure Laterality Date    LAPAROSCOPIC EXPLORATION FOR ECTOPIC PREGNANCY Left 11/16/2021    Procedure: LAPAROSCOPIC EXPLORATION FOR ECTOPIC PREGNANCY with Left Salpingostomy;  Surgeon: Karel Salinas DO;  Location: [facility] OR;   Service: Obstetrics/Gynecology;  Laterality: Left;    SALPINGECTOMY Left     WISDOM TOOTH EXTRACTION  2014       Family History  Family History   Problem Relation Age of Onset    No Known Problems Mother     Heart disease Father     Hypertension Father     Heart valve disorder Father     Ovarian cancer Maternal Grandmother     No Known Problems Maternal Grandfather     Hypertension Paternal Grandmother     No Known Problems Paternal Grandfather        Social History  Social History     Socioeconomic History    Marital status:    Tobacco Use    Smoking status: Never    Smokeless tobacco: Never   Vaping Use    Vaping status: Never Used   Substance and Sexual Activity    Alcohol use: Not Currently    Drug use: Never    Sexual activity: Defer       Objective  Physical Exam   Constitutional: She appears well-developed.   HENT:   Head: Normocephalic.   Neck: Neck normal appearance.  Pulmonary/Chest: Effort normal.   Abdominal: Abdomen appears normal.   Psychiatric: She has a normal mood and affect.       Limited exam due to telehealth visit.    Procedures    Result Review :   The following data was reviewed by: SHANICE Hicks on 09/10/2024:               Assessment and Plan    Urvashi Rinaldi is a 34 y.o. here for an acute concern.    Problem List Items Addressed This Visit    None  Visit Diagnoses       Other depression    -  Primary    Relevant Medications    sertraline (Zoloft) 25 MG tablet    busPIRone (BUSPAR) 5 MG tablet          Findings and plans discussed with patient who verbalizes understanding and agreement. Will followup with patient once results are in. Patient to followup at clinic PRN or in 2 weeks for further medical followup.    Patient's There is no height or weight on file to calculate BMI. indicating that she is could not have BMI calculated because patient was not weighed for this visit .      Follow Up   Return in about 2 weeks (around 9/24/2024), or if symptoms worsen or fail to  improve.  Findings and plans discussed with patient who verbalizes understanding and agreement. Will followup with patient once results are in. Patient was given instructions and counseling regarding her condition or for health maintenance advice. Please see specific information pulled into the AVS if appropriate.       SHANICE Hicks      This document has been electronically signed by SHANICE Hicks  September 10, 2024 15:00 EDT     EMR Dragon/Transcription Disclaimer:  Much of this encounter note is an electronic transcription/translation of spoken language to printed text.

## 2024-12-19 ENCOUNTER — OFFICE VISIT (OUTPATIENT)
Dept: FAMILY MEDICINE CLINIC | Facility: CLINIC | Age: 34
End: 2024-12-19
Payer: COMMERCIAL

## 2024-12-19 ENCOUNTER — LAB (OUTPATIENT)
Dept: FAMILY MEDICINE CLINIC | Facility: CLINIC | Age: 34
End: 2024-12-19
Payer: COMMERCIAL

## 2024-12-19 VITALS
TEMPERATURE: 98.2 F | HEART RATE: 111 BPM | OXYGEN SATURATION: 97 % | WEIGHT: 183.6 LBS | DIASTOLIC BLOOD PRESSURE: 80 MMHG | BODY MASS INDEX: 29.51 KG/M2 | SYSTOLIC BLOOD PRESSURE: 112 MMHG | HEIGHT: 66 IN

## 2024-12-19 DIAGNOSIS — E78.2 MIXED HYPERLIPIDEMIA: ICD-10-CM

## 2024-12-19 DIAGNOSIS — E78.2 MIXED HYPERLIPIDEMIA: Primary | ICD-10-CM

## 2024-12-19 DIAGNOSIS — F32.89 OTHER DEPRESSION: ICD-10-CM

## 2024-12-19 LAB
ALBUMIN SERPL-MCNC: 4.7 G/DL (ref 3.5–5.2)
ALBUMIN/GLOB SERPL: 1.5 G/DL
ALP SERPL-CCNC: 107 U/L (ref 39–117)
ALT SERPL W P-5'-P-CCNC: 15 U/L (ref 1–33)
ANION GAP SERPL CALCULATED.3IONS-SCNC: 12.4 MMOL/L (ref 5–15)
AST SERPL-CCNC: 11 U/L (ref 1–32)
BASOPHILS # BLD AUTO: 0.05 10*3/MM3 (ref 0–0.2)
BASOPHILS NFR BLD AUTO: 0.6 % (ref 0–1.5)
BILIRUB SERPL-MCNC: 0.4 MG/DL (ref 0–1.2)
BUN SERPL-MCNC: 14 MG/DL (ref 6–20)
BUN/CREAT SERPL: 22.2 (ref 7–25)
CALCIUM SPEC-SCNC: 9.2 MG/DL (ref 8.6–10.5)
CHLORIDE SERPL-SCNC: 104 MMOL/L (ref 98–107)
CHOLEST SERPL-MCNC: 176 MG/DL (ref 0–200)
CO2 SERPL-SCNC: 24.6 MMOL/L (ref 22–29)
CREAT SERPL-MCNC: 0.63 MG/DL (ref 0.57–1)
DEPRECATED RDW RBC AUTO: 42.1 FL (ref 37–54)
EGFRCR SERPLBLD CKD-EPI 2021: 121 ML/MIN/1.73
EOSINOPHIL # BLD AUTO: 0.1 10*3/MM3 (ref 0–0.4)
EOSINOPHIL NFR BLD AUTO: 1.2 % (ref 0.3–6.2)
ERYTHROCYTE [DISTWIDTH] IN BLOOD BY AUTOMATED COUNT: 13.9 % (ref 12.3–15.4)
GLOBULIN UR ELPH-MCNC: 3.1 GM/DL
GLUCOSE SERPL-MCNC: 91 MG/DL (ref 65–99)
HBA1C MFR BLD: 5.2 % (ref 4.8–5.6)
HCT VFR BLD AUTO: 38.1 % (ref 34–46.6)
HDLC SERPL-MCNC: 49 MG/DL (ref 40–60)
HGB BLD-MCNC: 12.7 G/DL (ref 12–15.9)
IMM GRANULOCYTES # BLD AUTO: 0.03 10*3/MM3 (ref 0–0.05)
IMM GRANULOCYTES NFR BLD AUTO: 0.4 % (ref 0–0.5)
LDLC SERPL CALC-MCNC: 107 MG/DL (ref 0–100)
LDLC/HDLC SERPL: 2.15 {RATIO}
LYMPHOCYTES # BLD AUTO: 1.58 10*3/MM3 (ref 0.7–3.1)
LYMPHOCYTES NFR BLD AUTO: 19.2 % (ref 19.6–45.3)
MCH RBC QN AUTO: 27.7 PG (ref 26.6–33)
MCHC RBC AUTO-ENTMCNC: 33.3 G/DL (ref 31.5–35.7)
MCV RBC AUTO: 83 FL (ref 79–97)
MONOCYTES # BLD AUTO: 0.4 10*3/MM3 (ref 0.1–0.9)
MONOCYTES NFR BLD AUTO: 4.9 % (ref 5–12)
NEUTROPHILS NFR BLD AUTO: 6.07 10*3/MM3 (ref 1.7–7)
NEUTROPHILS NFR BLD AUTO: 73.7 % (ref 42.7–76)
NRBC BLD AUTO-RTO: 0 /100 WBC (ref 0–0.2)
PLATELET # BLD AUTO: 375 10*3/MM3 (ref 140–450)
PMV BLD AUTO: 10.1 FL (ref 6–12)
POTASSIUM SERPL-SCNC: 3.7 MMOL/L (ref 3.5–5.2)
PROT SERPL-MCNC: 7.8 G/DL (ref 6–8.5)
RBC # BLD AUTO: 4.59 10*6/MM3 (ref 3.77–5.28)
SODIUM SERPL-SCNC: 141 MMOL/L (ref 136–145)
T4 FREE SERPL-MCNC: 1.56 NG/DL (ref 0.92–1.68)
TRIGL SERPL-MCNC: 109 MG/DL (ref 0–150)
TSH SERPL DL<=0.05 MIU/L-ACNC: 0.14 UIU/ML (ref 0.27–4.2)
VLDLC SERPL-MCNC: 20 MG/DL (ref 5–40)
WBC NRBC COR # BLD AUTO: 8.23 10*3/MM3 (ref 3.4–10.8)

## 2024-12-19 PROCEDURE — 83036 HEMOGLOBIN GLYCOSYLATED A1C: CPT | Performed by: FAMILY MEDICINE

## 2024-12-19 PROCEDURE — 36415 COLL VENOUS BLD VENIPUNCTURE: CPT

## 2024-12-19 PROCEDURE — 80050 GENERAL HEALTH PANEL: CPT | Performed by: FAMILY MEDICINE

## 2024-12-19 PROCEDURE — 80061 LIPID PANEL: CPT | Performed by: FAMILY MEDICINE

## 2024-12-19 PROCEDURE — 99214 OFFICE O/P EST MOD 30 MIN: CPT | Performed by: FAMILY MEDICINE

## 2024-12-19 PROCEDURE — 84439 ASSAY OF FREE THYROXINE: CPT | Performed by: FAMILY MEDICINE

## 2024-12-19 RX ORDER — SERTRALINE HYDROCHLORIDE 25 MG/1
25 TABLET, FILM COATED ORAL DAILY
Qty: 90 TABLET | Refills: 0 | Status: SHIPPED | OUTPATIENT
Start: 2024-12-19

## 2024-12-19 NOTE — PROGRESS NOTES
"Chief Complaint  Med Refill    Subjective          Urvashi Rinaldi presents to [facility]  Depression  Presents for follow-up visit. Symptoms include depressed mood and nervousness/anxiety. Patient is not experiencing: thoughts of death.Symptoms occur occasionally (improved with medication).   Her past medical history is significant for depression. Past treatments include SSRI. The treatment provides significant relief. Compliance with medications is %.   Hyperlipidemia  This is a chronic problem. The current episode started more than 1 year ago. The problem is controlled. Current antihyperlipidemic treatment includes diet change. The current treatment provides significant improvement of lipids.       Review of Systems   Psychiatric/Behavioral:  The patient is nervous/anxious.          Objective   Vital Signs:   /80 (BP Location: Right arm, Patient Position: Sitting, Cuff Size: Adult)   Pulse 111   Temp 98.2 °F (36.8 °C) (Temporal)   Ht 167.6 cm (66\")   Wt 83.3 kg (183 lb 9.6 oz)   SpO2 97%   BMI 29.63 kg/m²     Physical Exam  Constitutional:       General: She is not in acute distress.     Appearance: Normal appearance. She is well-developed and well-groomed. She is not ill-appearing, toxic-appearing or diaphoretic.   HENT:      Head: Normocephalic.      Nose: Nose normal. No congestion or rhinorrhea.      Mouth/Throat:      Mouth: Mucous membranes are moist.      Pharynx: Oropharynx is clear. No oropharyngeal exudate or posterior oropharyngeal erythema.   Eyes:      General: Lids are normal.         Right eye: No discharge.         Left eye: No discharge.      Extraocular Movements: Extraocular movements intact.      Pupils: Pupils are equal, round, and reactive to light.   Neck:      Vascular: No carotid bruit.   Cardiovascular:      Rate and Rhythm: Normal rate and regular rhythm.      Pulses: Normal pulses.      Heart sounds: Normal heart sounds. No " murmur heard.     No friction rub. No gallop.   Pulmonary:      Effort: Pulmonary effort is normal. No respiratory distress.      Breath sounds: Normal breath sounds. No stridor. No wheezing, rhonchi or rales.   Chest:      Chest wall: No tenderness.   Abdominal:      General: Bowel sounds are normal. There is no distension.      Palpations: Abdomen is soft. There is no mass.      Tenderness: There is no abdominal tenderness. There is no right CVA tenderness, left CVA tenderness, guarding or rebound.      Hernia: No hernia is present.   Musculoskeletal:         General: No swelling or tenderness. Normal range of motion.      Cervical back: Normal range of motion and neck supple. No rigidity or tenderness.      Right lower leg: No edema.      Left lower leg: No edema.   Lymphadenopathy:      Cervical: No cervical adenopathy.   Skin:     General: Skin is warm.      Capillary Refill: Capillary refill takes less than 2 seconds.      Coloration: Skin is not jaundiced.      Findings: No bruising, erythema or rash.   Neurological:      General: No focal deficit present.      Mental Status: She is alert and oriented to person, place, and time.      Motor: Motor function is intact. No weakness.      Coordination: Coordination is intact.      Gait: Gait is intact. Gait normal.   Psychiatric:         Attention and Perception: Attention normal.         Mood and Affect: Mood normal.         Speech: Speech normal.         Behavior: Behavior normal.         Cognition and Memory: Cognition normal.         Judgment: Judgment normal.        Result Review :                 Assessment and Plan    Diagnoses and all orders for this visit:    1. Mixed hyperlipidemia (Primary)  -     CBC Auto Differential; Future  -     Comprehensive Metabolic Panel; Future  -     Hemoglobin A1c; Future  -     Lipid Panel; Future  -     T4, Free; Future  -     TSH; Future    2. Other depression  -     sertraline (Zoloft) 25 MG tablet; Take 1 tablet by  mouth Daily.  Dispense: 90 tablet; Refill: 0      Patient's Body mass index is 29.63 kg/m². indicating that she is overweight (BMI 25-29.9). Patient's (Body mass index is 29.63 kg/m².) indicates that they are overweight with health conditions that include dyslipidemias . Weight is unchanged. BMI is above average; BMI management plan is completed. We discussed low calorie, low carb based diet program, portion control, and increasing exercise. .    Follow Up   Return in about 3 months (around 3/19/2025), or labs today.  Patient was given instructions and counseling regarding her condition or for health maintenance advice. Please see specific information pulled into the AVS if appropriate.     This document has been electronically signed by SHANICE Hicks  December 19, 2024 15:00 EST

## 2024-12-23 DIAGNOSIS — E06.9 THYROIDITIS: Primary | ICD-10-CM

## 2024-12-26 ENCOUNTER — OFFICE VISIT (OUTPATIENT)
Dept: ENDOCRINOLOGY | Facility: CLINIC | Age: 34
End: 2024-12-26
Payer: COMMERCIAL

## 2024-12-26 VITALS
DIASTOLIC BLOOD PRESSURE: 89 MMHG | HEART RATE: 107 BPM | OXYGEN SATURATION: 98 % | SYSTOLIC BLOOD PRESSURE: 130 MMHG | WEIGHT: 180.6 LBS | BODY MASS INDEX: 29.15 KG/M2

## 2024-12-26 DIAGNOSIS — E04.2 MULTIPLE THYROID NODULES: Chronic | ICD-10-CM

## 2024-12-26 DIAGNOSIS — E06.9 THYROIDITIS: Primary | ICD-10-CM

## 2024-12-26 DIAGNOSIS — E05.90 HYPERTHYROIDISM: ICD-10-CM

## 2024-12-26 LAB
T4 FREE SERPL-MCNC: 1.54 NG/DL (ref 0.92–1.68)
TSH SERPL DL<=0.05 MIU/L-ACNC: 0.19 UIU/ML (ref 0.27–4.2)

## 2024-12-26 PROCEDURE — 84443 ASSAY THYROID STIM HORMONE: CPT

## 2024-12-26 PROCEDURE — 84439 ASSAY OF FREE THYROXINE: CPT

## 2024-12-26 PROCEDURE — 84445 ASSAY OF TSI GLOBULIN: CPT

## 2024-12-26 PROCEDURE — 83520 IMMUNOASSAY QUANT NOS NONAB: CPT

## 2024-12-26 NOTE — PROGRESS NOTES
Chief Complaint   Patient presents with    Thyroid Problem     Thyroid labs 12/19/2024     JONATAN Rinaldi is a 34 y.o. female who presents to the clinic today for follow-up of hypothyroidism and thyroid nodules. She has a history of hyperthyroidism when she was approximately 35 weeks pregnant in 2020 but gave birth prior to being evaluated. TSH had been normal since that time. Most recent TSH was low at 0.145 with Free T4 1.56 on 12/19/24.  She also has thyroid nodules on previous ultrasound. Her last ultrasound was January 23, 2024 which showed scattered small cysts of the thyroid including 7 mm cyst in the right lobe and 8 mm nodule in the left lobe. She would like another thyroid ultrasound for further evaluation.  She reports a history of gestational diabetes in 2023, not on insulin. Last A1c on 12/19/2024 was 5.2. She denies any dysphagia, changes in her neck compressive symptoms or hyperthyroid symptoms.     Birth state: [place]  Previous history of radiation to face/neck: no  Consuming foods high in iodine: no  Family history of thyroid complications: no  Previous imaging: Thyroid US 1/23/24  Biotin: no     The following portions of the patient's history were reviewed and updated as appropriate: allergies, current medications, past family history, past medical history, past social history, past surgical history, and problem list.    /89 (BP Location: Right arm, Patient Position: Sitting, Cuff Size: Adult)   Pulse 107   Wt 81.9 kg (180 lb 9.6 oz)   LMP 12/15/2024 (Exact Date)   SpO2 98%   BMI 29.15 kg/m²    Past Medical History:   Diagnosis Date    Gestational diabetes 3/14/2023    Hyperthyroidism     Pregnancy     Urinary tract infection      Past Surgical History:   Procedure Laterality Date    LAPAROSCOPIC EXPLORATION FOR ECTOPIC PREGNANCY Left 11/16/2021    Procedure: LAPAROSCOPIC EXPLORATION FOR ECTOPIC PREGNANCY with Left Salpingostomy;  Surgeon: Karel Salinas DO;  Location:   [facility] OR;  Service: Obstetrics/Gynecology;  Laterality: Left;    SALPINGECTOMY Left     WISDOM TOOTH EXTRACTION  2014      Family History   Problem Relation Age of Onset    No Known Problems Mother     Heart disease Father     Hypertension Father     Heart valve disorder Father     Ovarian cancer Maternal Grandmother     No Known Problems Maternal Grandfather     Hypertension Paternal Grandmother     No Known Problems Paternal Grandfather       Social History     Socioeconomic History    Marital status:    Tobacco Use    Smoking status: Never    Smokeless tobacco: Never   Vaping Use    Vaping status: Never Used   Substance and Sexual Activity    Alcohol use: Not Currently    Drug use: Never    Sexual activity: Defer      No Known Allergies   Current Outpatient Medications on File Prior to Visit   Medication Sig Dispense Refill    sertraline (Zoloft) 25 MG tablet Take 1 tablet by mouth Daily. 90 tablet 0     No current facility-administered medications on file prior to visit.      Review of Systems   HENT:  Negative for trouble swallowing.    Eyes:  Negative for blurred vision.   Respiratory:  Negative for cough and choking.    Cardiovascular:  Negative for palpitations.   Gastrointestinal:  Negative for constipation and diarrhea.   Endocrine: Negative for cold intolerance and heat intolerance.   Neurological:  Negative for tremors.   Psychiatric/Behavioral:  The patient is nervous/anxious.      Physical Exam  Vitals reviewed.   Constitutional:       Appearance: Normal appearance.   Eyes:      Extraocular Movements: Extraocular movements intact.   Neck:      Thyroid: Thyromegaly present.   Cardiovascular:      Rate and Rhythm: Normal rate.   Pulmonary:      Effort: Pulmonary effort is normal.   Skin:     General: Skin is warm.   Neurological:      General: No focal deficit present.      Mental Status: She is alert and oriented to person, place, and time.   Psychiatric:         Mood and Affect: Mood normal.          Behavior: Behavior normal.         Thought Content: Thought content normal.         Judgment: Judgment normal.       Labs/Imaging    Thyroid US 1/23/24  EXAM:    US Soft Tissues Head and Neck, Thyroid     EXAM DATE:    1/23/2024 8:46 AM     CLINICAL HISTORY:    nodule follow-up; E06.9-Thyroiditis, unspecified     TECHNIQUE:    Real-time ultrasound scan of the thyroid gland and soft tissues of the  neck with image documentation.     COMPARISON:    US THYROID- dated january 5 2023     FINDINGS:    LEFT THYROID LOBE:  See below.      RIGHT THYROID LOBE:  Again there are scattered small cyst of the  thyroid including a right thyroid gland cyst measuring about 7 mm and a  left lobe of thyroid nodule again seen measuring about 8 mm.    ISTHMUS:  Unremarkable as visualized.  No enlarged or calcified  nodules.    LYMPH NODES:  Unremarkable as visualized.  No lymphadenopathy.     IMPRESSION:    Again there are scattered small cyst of the thyroid including a right  thyroid gland cyst measuring about 7 mm and a left lobe of thyroid  nodule again seen measuring about 8 mm.     This report was finalized on 1/23/2024 9:09 AM by Dr. All Denson MD.     CMP  Lab Results   Component Value Date    GLUCOSE 91 12/19/2024    BUN 14 12/19/2024    CREATININE 0.63 12/19/2024    EGFRIFNONA 106 11/16/2021    BCR 22.2 12/19/2024    K 3.7 12/19/2024    CO2 24.6 12/19/2024    CALCIUM 9.2 12/19/2024    ALBUMIN 4.7 12/19/2024    AGRATIO 1.5 12/19/2024    AST 11 12/19/2024    ALT 15 12/19/2024      CBC w/DIFF   Lab Results   Component Value Date    WBC 8.23 12/19/2024    RBC 4.59 12/19/2024    HGB 12.7 12/19/2024    HCT 38.1 12/19/2024    MCV 83.0 12/19/2024    MCH 27.7 12/19/2024    MCHC 33.3 12/19/2024    RDW 13.9 12/19/2024    RDWSD 42.1 12/19/2024    MPV 10.1 12/19/2024     12/19/2024    NEUTRORELPCT 73.7 12/19/2024    LYMPHORELPCT 19.2 (L) 12/19/2024    MONORELPCT 4.9 (L) 12/19/2024    EOSRELPCT 1.2 12/19/2024    BASORELPCT 0.6  "12/19/2024    AUTOIGPER 0.4 12/19/2024    NEUTROABS 6.07 12/19/2024    LYMPHSABS 1.58 12/19/2024    MONOSABS 0.40 12/19/2024    EOSABS 0.10 12/19/2024    BASOSABS 0.05 12/19/2024    AUTOIGNUM 0.03 12/19/2024    NRBC 0.0 12/19/2024     TSH  Lab Results   Component Value Date    TSH 0.145 (L) 12/19/2024    TSH 0.779 08/09/2023    TSH 0.385 08/09/2022     T4  Lab Results   Component Value Date    FREET4 1.56 12/19/2024    FREET4 1.23 08/09/2023    FREET4 1.35 08/09/2022     No results found for: \"A9WSZFJ\"  T3  Lab Results   Component Value Date    T3FREE 3.84 08/09/2022    T3FREE 3.37 02/15/2022     No results found for: \"S9SRWJL\"  TRAb  No results found for: \"TSURCPAB\"  TPO  Lab Results   Component Value Date    THYROIDAB <9 08/09/2023     Assessment and Plan  Diagnoses and all orders for this visit:    1. Thyroiditis (Primary)    2. Hyperthyroidism  Assessment & Plan:  -Goiter on exam  -Recently low TSH with normal free T4.  Consistent with possible subclinical hyperthyroidism, early overt hyperthyroidism, or thyroiditis.  Etiology includes thyroiditis, Graves' disease, toxic nodule (though none palpated on today's exam).  Recent symptoms could be in part due to thyroid dysfunction.  -Recheck TFTs today along with thyroid antibodies.  Pending results, additional evaluation or treatment may be necessary.     Orders:  -     T4, Free  -     TSH  -     Thyroid Stimulating Immunoglobulin  -     Thyrotropin Receptor Antibody    3. Multiple thyroid nodules  Assessment & Plan:  -Thyromegaly on exam today.  -Last thyroid US 1/23/24 showed scattered small cysts of thyroid including 7 mm cyst in the left lobe and 8 mm nodule in the right lobe.  -It has been 1 year since her last ultrasound.  She would like repeat ultrasound for monitoring.        Return in about 1 year (around 12/26/2025). The patient was instructed to contact the clinic with any interval questions or concerns.    Please note that portions of this document were " completed with a voice recognition program. Efforts were made to edit the dictations, but occasionally words are mis-transcribed.  This document has been electronically signed by SHANICE Charles  December 26, 2024 14:15 EST

## 2024-12-26 NOTE — ASSESSMENT & PLAN NOTE
-Thyromegaly on exam today.  -Last thyroid US 1/23/24 showed scattered small cysts of thyroid including 7 mm cyst in the left lobe and 8 mm nodule in the right lobe.  -It has been 1 year since her last ultrasound.  She would like repeat ultrasound for monitoring.

## 2024-12-26 NOTE — ASSESSMENT & PLAN NOTE
-Goiter on exam  -Recently low TSH with normal free T4.  Consistent with possible subclinical hyperthyroidism, early overt hyperthyroidism, or thyroiditis.  Etiology includes thyroiditis, Graves' disease, toxic nodule (though none palpated on today's exam).  Recent symptoms could be in part due to thyroid dysfunction.  -Recheck TFTs today along with thyroid antibodies.  Pending results, additional evaluation or treatment may be necessary.

## 2024-12-27 LAB — TSH RECEP AB SER-ACNC: <1.1 IU/L (ref 0–1.75)

## 2024-12-28 LAB — TSI SER-ACNC: <0.1 IU/L (ref 0–0.55)

## 2024-12-31 ENCOUNTER — TELEPHONE (OUTPATIENT)
Dept: FAMILY MEDICINE CLINIC | Facility: CLINIC | Age: 34
End: 2024-12-31
Payer: COMMERCIAL

## 2024-12-31 NOTE — TELEPHONE ENCOUNTER
----- Message from Suki Coy sent at 12/31/2024  9:56 AM EST -----  Please let patient know results showed a abnormal tsh, looks like this was addressed at her endocrinology appt. All other labs stable.. Thank you.

## 2025-01-03 ENCOUNTER — HOSPITAL ENCOUNTER (OUTPATIENT)
Facility: HOSPITAL | Age: 35
Discharge: HOME | End: 2025-01-03
Admitting: NURSE PRACTITIONER
Payer: COMMERCIAL

## 2025-01-03 DIAGNOSIS — E06.9 THYROIDITIS: ICD-10-CM

## 2025-01-03 PROCEDURE — 76536 US EXAM OF HEAD AND NECK: CPT

## 2025-01-03 PROCEDURE — 76536 US EXAM OF HEAD AND NECK: CPT | Performed by: RADIOLOGY

## (undated) DEVICE — TISSUE RETRIEVAL SYSTEM: Brand: INZII RETRIEVAL SYSTEM

## (undated) DEVICE — COR GYN LAPAROSCOPY: Brand: MEDLINE INDUSTRIES, INC.

## (undated) DEVICE — ENDOPOUCH RETRIEVER SPECIMEN RETRIEVAL BAGS: Brand: ENDOPOUCH RETRIEVER

## (undated) DEVICE — TUBING, SUCTION, 1/4" X 20', STRAIGHT: Brand: MEDLINE INDUSTRIES, INC.

## (undated) DEVICE — APPL CHLORAPREP HI/LITE 26ML ORNG

## (undated) DEVICE — GLV SURG PREMIERPRO MIC LTX PF SZ8 BRN

## (undated) DEVICE — DRSNG WND BORDR/ADHS NONADHR/GZ LF 2X2IN STRL

## (undated) DEVICE — GOWN,REINF,POLY,ECL,PP SLV,XXL: Brand: MEDLINE

## (undated) DEVICE — ENDOPATH XCEL UNIVERSAL TROCAR STABLILITY SLEEVES: Brand: ENDOPATH XCEL

## (undated) DEVICE — 3M™ STERI-STRIP™ REINFORCED ADHESIVE SKIN CLOSURES, R1547, 1/2 IN X 4 IN (12 MM X 100 MM), 6 STRIPS/ENVELOPE: Brand: 3M™ STERI-STRIP™

## (undated) DEVICE — ENDOPATH XCEL BLADELESS TROCARS WITH STABILITY SLEEVES: Brand: ENDOPATH XCEL

## (undated) DEVICE — SUT MNCRYL 4/0 PS2 18 IN

## (undated) DEVICE — ST TBG PNEUMOCLEAR EVAC SMOKE HIFLO

## (undated) DEVICE — PATIENT RETURN ELECTRODE, SINGLE-USE, CONTACT QUALITY MONITORING, ADULT, WITH 9FT CORD, FOR PATIENTS WEIGING OVER 33LBS. (15KG): Brand: MEGADYNE